# Patient Record
Sex: FEMALE | Race: WHITE
[De-identification: names, ages, dates, MRNs, and addresses within clinical notes are randomized per-mention and may not be internally consistent; named-entity substitution may affect disease eponyms.]

---

## 2017-05-14 ENCOUNTER — HOSPITAL ENCOUNTER (EMERGENCY)
Dept: HOSPITAL 25 - UCCORT | Age: 62
Discharge: HOME | End: 2017-05-14
Payer: COMMERCIAL

## 2017-05-14 VITALS — DIASTOLIC BLOOD PRESSURE: 54 MMHG | SYSTOLIC BLOOD PRESSURE: 103 MMHG
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
## 2017-05-14 DIAGNOSIS — Z95.5: ICD-10-CM

## 2017-05-14 DIAGNOSIS — M25.462: ICD-10-CM

## 2017-05-14 DIAGNOSIS — S83.92XA: Primary | ICD-10-CM

## 2017-05-14 DIAGNOSIS — Z88.2: ICD-10-CM

## 2017-05-14 DIAGNOSIS — Z87.891: ICD-10-CM

## 2017-05-14 DIAGNOSIS — I25.2: ICD-10-CM

## 2017-05-14 DIAGNOSIS — Z79.84: ICD-10-CM

## 2017-05-14 DIAGNOSIS — Z87.442: ICD-10-CM

## 2017-05-14 DIAGNOSIS — I10: ICD-10-CM

## 2017-05-14 DIAGNOSIS — E11.9: ICD-10-CM

## 2017-05-14 DIAGNOSIS — Z95.0: ICD-10-CM

## 2017-05-14 DIAGNOSIS — M17.12: ICD-10-CM
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
## 2017-05-14 DIAGNOSIS — Z88.6: ICD-10-CM
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
## 2017-05-14 DIAGNOSIS — J45.909: ICD-10-CM

## 2017-05-14 PROCEDURE — G0463 HOSPITAL OUTPT CLINIC VISIT: HCPCS

## 2017-05-14 PROCEDURE — 99212 OFFICE O/P EST SF 10 MIN: CPT

## 2017-05-14 NOTE — UC
Lower Extremity/Ankle HPI





- HPI Summary


HPI Summary: 





complaint of left leg pain that worsened approx 2 months ago


6 months ago she fell of a ramp backwards and thats when she started having leg 

pain


in 2017 PCP found a lump in her left groin- lump has resolved 


for the past 2 months the  pain starts in upper thigh and radiates into her 

knee 


left knee pain for 2 months


aching pain - worse at night because she sleeps on her left side and weight of 

right leg pressing on left leg increases pain


pain is worse when she is ambulating


nothing lessenes the pain


never had folloup appt with Dr Woodard





- History of Current Complaint


Chief Complaint: UCLowerExtremity


Stated Complaint: LEFT LEG PAIN


Time Seen by Provider: 17 13:15


Hx Obtained From: Patient





- Allergies/Home Medications


Allergies/Adverse Reactions: 


 Allergies











Allergy/AdvReac Type Severity Reaction Status Date / Time


 


Aspirin [ASA] Allergy Intermediate GI Upset Verified 17 13:07


 


Ibuprofen [From Motrin] Allergy Intermediate GI Upset Verified 17 13:07


 


Sulfa Drugs Allergy Intermediate GI Upset Verified 17 13:07


 


Camphor [From Vicks Vaporub] Allergy  "LUNGS Verified 17 13:07





   TIGHTEN UP"  


 


Eucalyptus Oil Allergy  "LUNGS Verified 17 13:07





[From Vicks Vaporub]   TIGHTEN UP"  


 


Latex Allergy  red and Verified 17 13:07





   itchy  


 


Menthol [From Vicks Vaporub] Allergy  "LUNGS Verified 17 13:07





   TIGHTEN UP"  


 


adhesive tape Allergy Intermediate Rash Uncoded 17 13:07


 


Mints Allergy  Coughing Uncoded 17 13:07











Home Medications: 


 Home Medications





Acetaminophen [Acetaminophen Extra Stren] 1,500 mg PO BEDTIME 17 [History 

Confirmed 17]


Aspirin EC Low Dose* [Ecotrin EC Low Dose 81 MG*] 81 mg PO DAILY 17 [

History Confirmed 17]


Pantoprazole TAB (NF) [Protonix TAB (NF)] 40 mg PO DAILY 17 [History 

Confirmed 17]


metFORMIN* [Glucophage 850 MG TAB *] 850 mg PO 0800,1200,1700 17 [History 

Confirmed 17]











PMH/Surg Hx/FS Hx/Imm Hx


Previously Healthy: Yes


Endocrine History Of: Reports: Diabetes


Cardiovascular History Of: Reports: Cardiac Disorders - MI , Stents, 

Pacemaker, Hypertension, Pacemaker/ICD, Myocardial Infarction


Respiratory History Of: Reports: Asthma


   Denies: COPD


GI/ History Of: Reports: Kidney Stones


Cancer History Of: 


   Denies: Breast Cancer





- Surgical History


Surgical History: Yes


Surgery Procedure, Year, and Place:  X 3SHOCKWAVE LITHOTRIPSY-

CMCCARDIAC STENTS- CMCOophorectomyCARDIAC STENTS AND PACEMAKER INSERTION, AUG 

2013





- Family History


Known Family History: 


   Negative: Cardiac Disease, Hypertension, Diabetes





- Social History


Occupation: Disabled


Lives: With Family


Alcohol Use: None


Substance Use Type: None


Smoking Status (MU): Former Smoker


Type: Cigarettes


Amount Used/How Often: 1 PPD


Length of Time of Smoking/Using Tobacco: 44 Years


When Did the Patient Quit Smoking/Using Tobacco: 2013


Household Exposure Type: Cigarettes





- Immunization History


Most Recent Influenza Vaccination: 


Most Recent Tetanus Shot: unk


Most Recent Pneumonia Vaccination: none





Review of Systems


Constitutional: Negative


Skin: Negative


Eyes: Negative


ENT: Negative


Respiratory: Negative


Cardiovascular: Negative


Gastrointestinal: Negative


Genitourinary: Negative


Motor: Negative


Neurovascular: Negative


Musculoskeletal: Other: - left leg pain


Neurological: Negative


Psychological: Negative


All Other Systems Reviewed And Are Negative: Yes





Physical Exam


Triage Information Reviewed: Yes


Appearance: No Pain Distress, Well-Nourished


Vital Signs: 


 Initial Vital Signs











Temp  98.8 F   17 13:00


 


Pulse  70   17 13:00


 


Resp  16   17 13:00


 


BP  103/54   17 13:00


 


Pulse Ox  98   17 13:00











Vital Signs Reviewed: Yes


Eyes: Positive: Conjunctiva Clear


ENT: Positive: Pharynx normal, TMs normal


Neck: Positive: No Lymphadenopathy


Respiratory: Positive: Lungs clear, Normal breath sounds, No respiratory 

distress


Cardiovascular: Positive: RRR, No Murmur, Pulses Normal, Brisk Capillary Refill


Abdomen Description: Positive: Nontender, Soft


Bowel Sounds: Positive: Present


Musculoskeletal: Positive: Other: - LLE-knee- No bony deformities, inflammation

, or tenderness in bony prominences or soft tissue.  No bakers cyst. Full ROM (

extension/flexion). Limited internal and external rotation.  Medial, lateral 

meniscus; anterior, posterior cruciate ligaments ,medial & lateral collateral 

ligaments intact as assessed with negative Anterior/Posterior Drawer signs, 

Lachmans, and McMurrays Tests. No effusion, bulge/balloon sign. left thigh - 

slight tenderness in adductor longus- no edema on left thigh


Neurological: Positive: Alert


Psychological Exam: Normal


Skin Exam: Normal





Lower Extremity Course/Dx





- Course


Course Of Treatment: exam completed.  physcial exam reveals muscle tenderness 

in adductor longus.  no edema in LLE- pulses 2 +.  x-ray shows osteoarthritis 

with small effusion- will start NSAIDs refer to ortho and PCP followup





- Differential Dx/Diagnosis


Differential Diagnosis/HQI/PQRI: Contusion, Fracture (Closed), Sprain, Strain


Provider Diagnoses: LLE muscle sprain, osteoarthritis left knee, small effusion 

left knee





Discharge





- Discharge Plan


Condition: Stable


Disposition: HOME


Prescriptions: 


Naproxen Sodium [Naproxen Sodium 220 mg cap] 220 mg PO BID #20 cap


Patient Education Materials:  Osteoarthritis (ED), Musculoskeletal Pain (ED)


Referrals: 


Andre Kumar MD [Primary Care Provider] - 


Additional Instructions: 


Increase fluids and rest


Take naproxen for  pain, rest leg


Please review your discharge instructions. 


If your symptoms do not improve please call your primary care provider or 

return to urgent care.

## 2017-05-14 NOTE — RAD
INDICATION:  Left femur pain.



TECHNIQUE: 2 views of the left femur were obtained.



FINDINGS: The bones are normal alignment. No fracture is seen.



IMPRESSION:  NO EVIDENCE FOR FRACTURE.

## 2017-11-24 ENCOUNTER — HOSPITAL ENCOUNTER (EMERGENCY)
Dept: HOSPITAL 25 - UCEAST | Age: 62
Discharge: HOME | End: 2017-11-24
Payer: COMMERCIAL

## 2017-11-24 VITALS — DIASTOLIC BLOOD PRESSURE: 61 MMHG | SYSTOLIC BLOOD PRESSURE: 123 MMHG

## 2017-11-24 DIAGNOSIS — Z95.5: ICD-10-CM

## 2017-11-24 DIAGNOSIS — Z88.6: ICD-10-CM

## 2017-11-24 DIAGNOSIS — B95.61: ICD-10-CM

## 2017-11-24 DIAGNOSIS — Z95.0: ICD-10-CM

## 2017-11-24 DIAGNOSIS — E78.5: ICD-10-CM

## 2017-11-24 DIAGNOSIS — I10: ICD-10-CM

## 2017-11-24 DIAGNOSIS — Z88.2: ICD-10-CM

## 2017-11-24 DIAGNOSIS — Z91.040: ICD-10-CM

## 2017-11-24 DIAGNOSIS — I25.2: ICD-10-CM

## 2017-11-24 DIAGNOSIS — Z87.891: ICD-10-CM

## 2017-11-24 DIAGNOSIS — L03.011: Primary | ICD-10-CM

## 2017-11-24 DIAGNOSIS — E11.9: ICD-10-CM

## 2017-11-24 PROCEDURE — 87205 SMEAR GRAM STAIN: CPT

## 2017-11-24 PROCEDURE — 10060 I&D ABSCESS SIMPLE/SINGLE: CPT

## 2017-11-24 PROCEDURE — 87186 SC STD MICRODIL/AGAR DIL: CPT

## 2017-11-24 PROCEDURE — 99212 OFFICE O/P EST SF 10 MIN: CPT

## 2017-11-24 PROCEDURE — 87641 MR-STAPH DNA AMP PROBE: CPT

## 2017-11-24 PROCEDURE — 87070 CULTURE OTHR SPECIMN AEROBIC: CPT

## 2017-11-24 PROCEDURE — 87640 STAPH A DNA AMP PROBE: CPT

## 2017-11-24 PROCEDURE — G0463 HOSPITAL OUTPT CLINIC VISIT: HCPCS

## 2017-11-24 PROCEDURE — 87077 CULTURE AEROBIC IDENTIFY: CPT

## 2017-11-24 NOTE — UC
Progress





- Progress Note


Progress Note: 





Received report of S. aureus positive sample on wound culture from today. MRSA 

negative. She was prescribed cephalexin and bactrim. Treatment is appropriate 

pending sensitivities.

## 2017-11-24 NOTE — UC
Hand/Wrist HPI





- HPI Summary


HPI Summary: 





63 yo female with right index pain/redness/swelling that started 3 days ago


started near lateral nail bed


no with fusiform swelling of finger extending to dorsum of hand


no f/c


she is a diabetic











- History Of Current Complaint


Chief Complaint: UCUpperExtremity


Stated Complaint: FINGER INJURY


Time Seen by Provider: 17 15:08


Hx Obtained From: Patient


Onset/Duration: Gradual Onset, Lasting Days


Severity Initially: Mild


Severity Currently: Severe


Pain Intensity: 8 - if she bumps it


Character Of Pain: Throbbing


Aggravating Factor(s): Movement


Alleviating Factor(s): Rest


Associated Signs And Symptoms: Positive: Swelling, Redness


Related History: Dominant Hand Right





- Allergies/Home Medications


Allergies/Adverse Reactions: 


 Allergies











Allergy/AdvReac Type Severity Reaction Status Date / Time


 


Aspirin [ASA] Allergy Intermediate GI Upset Verified 17 14:57


 


Ibuprofen [From Motrin] Allergy Intermediate GI Upset Verified 17 14:57


 


Sulfa Drugs Allergy Intermediate GI Upset Verified 17 14:57


 


Camphor [From Vicks Vaporub] Allergy  "LUNGS Verified 17 14:57





   TIGHTEN UP"  


 


Eucalyptus Oil Allergy  "LUNGS Verified 17 14:57





[From Vicks Vaporub]   TIGHTEN UP"  


 


Latex Allergy  red and Verified 17 14:57





   itchy  


 


Menthol [From Vicks Vaporub] Allergy  "LUNGS Verified 17 14:57





   TIGHTEN UP"  


 


adhesive tape Allergy Intermediate Rash Uncoded 17 14:57


 


Mints Allergy  Coughing Uncoded 17 14:57














PMH/Surg Hx/FS Hx/Imm Hx


Previously Healthy: Yes


Endocrine History: Diabetes, Dyslipidemia


Cardiovascular History: Cardiac Disease, Hypertension, Myocardial Infarction





- Surgical History


Surgical History: Yes


Surgery Procedure, Year, and Place:  X 3SHOCKWAVE LITHOTRIPSY-

CMCCARDIAC STENTS- CMCOophorectomyCARDIAC STENTS AND PACEMAKER INSERTION, AUG 

2013





- Family History


Known Family History: 


   Negative: Cardiac Disease, Hypertension, Diabetes





- Social History


Alcohol Use: None


Substance Use Type: Prescribed


Smoking Status (MU): Former Smoker


Type: Cigarettes


Amount Used/How Often: 1 PPD


Length of Time of Smoking/Using Tobacco: 44 Years


When Did the Patient Quit Smoking/Using Tobacco: 2013


Household Exposure Type: Cigarettes





- Immunization History


Most Recent Influenza Vaccination: 


Most Recent Tetanus Shot: unk


Most Recent Pneumonia Vaccination: none





Review of Systems


Constitutional: Negative


Skin: Negative


Eyes: Negative


ENT: Negative


Respiratory: Negative


Cardiovascular: Negative


Gastrointestinal: Negative


Genitourinary: Negative


Motor: Negative


Neurovascular: Negative


Musculoskeletal: Negative


Neurological: Negative


Psychological: Negative


Is Patient Immunocompromised?: No


All Other Systems Reviewed And Are Negative: Yes





Physical Exam


Triage Information Reviewed: Yes


Appearance: Well-Appearing, No Pain Distress, Well-Nourished


Vital Signs: 


 Initial Vital Signs











Temp  98.1 F   17 14:53


 


Pulse  74   17 14:53


 


Resp  12   17 14:53


 


BP  123/61   17 14:53


 


Pulse Ox  99   17 14:53











Eyes: Positive: Conjunctiva Clear


ENT: Negative: Nasal congestion, Nasal drainage, Trismus, Muffled voice, Hoarse 

voice


Neck: Positive: Supple


Respiratory: Positive: Lungs clear, Normal breath sounds, No respiratory 

distress, No accessory muscle use


Cardiovascular: Positive: RRR, No Murmur


Musculoskeletal: Positive: Other: - see image


Neurological: Positive: Alert, Muscle Tone Normal


Skin Exam: Other - see image





Procedures





- Procedure Summary


Procedure Summary: 





I&D right index finger paronychia





TO





sterile prep





incised with 18 gu needle


glo pus expresses





culture obtained 





tolerated procedure well





- Incision and Drainage


Site: right index finger


Instrument(s): Needle





Hand/Wrist Course/Dx





- Differential Dx/Diagnosis


Provider Diagnoses: right index finger paronychia.  right index finger 

cellulitis





Discharge





- Discharge Plan


Condition: Stable


Disposition: HOME


Prescriptions: 


Cephalexin CAP* [Keflex CAP*] 500 mg PO QID #28 cap


Sulfamethox/Trimethoprim DS* [Bactrim /160 TAB*] 1 tab PO BID #14 tab


Patient Education Materials:  Paronychia (ED), Cellulitis (ED)


Referrals: 


Andre Kumar MD [Primary Care Provider] - 3 Days


Additional Instructions: 


to er for new or worsening symptoms





if the bactrim DS upsets your stomach take with food





a culture is pending


we will try to eliminate one of the antibiotics once your culture results return





Images


Hands: 


  __________________________














  __________________________





 1 - red/swollen/paroshubhama

## 2017-11-26 NOTE — UC
Progress





- Progress Note


Progress Note: 


sensitive to bactrim and cephalexin. 


no changes


[facility] 11/26/17  [id number]

## 2017-12-03 ENCOUNTER — HOSPITAL ENCOUNTER (EMERGENCY)
Dept: HOSPITAL 25 - ED | Age: 62
Discharge: HOME | End: 2017-12-03
Payer: COMMERCIAL

## 2017-12-03 VITALS — DIASTOLIC BLOOD PRESSURE: 62 MMHG | SYSTOLIC BLOOD PRESSURE: 108 MMHG

## 2017-12-03 DIAGNOSIS — R60.9: ICD-10-CM

## 2017-12-03 DIAGNOSIS — L02.511: Primary | ICD-10-CM

## 2017-12-03 LAB
ALBUMIN SERPL BCG-MCNC: 4 G/DL (ref 3.2–5.2)
ALP SERPL-CCNC: 98 U/L (ref 34–104)
ALT SERPL W P-5'-P-CCNC: 5 U/L (ref 7–52)
ANION GAP SERPL CALC-SCNC: 11 MMOL/L (ref 2–11)
AST SERPL-CCNC: 11 U/L (ref 13–39)
BUN SERPL-MCNC: 22 MG/DL (ref 6–24)
BUN/CREAT SERPL: 17.5 (ref 8–20)
CALCIUM SERPL-MCNC: 9.7 MG/DL (ref 8.6–10.3)
CHLORIDE SERPL-SCNC: 98 MMOL/L (ref 101–111)
GLOBULIN SER CALC-MCNC: 3.3 G/DL (ref 2–4)
GLUCOSE SERPL-MCNC: 220 MG/DL (ref 70–100)
HCO3 SERPL-SCNC: 23 MMOL/L (ref 22–32)
HCT VFR BLD AUTO: 42 % (ref 35–47)
HGB BLD-MCNC: 14.5 G/DL (ref 12–16)
MCH RBC QN AUTO: 31 PG (ref 27–31)
MCHC RBC AUTO-ENTMCNC: 34 G/DL (ref 31–36)
MCV RBC AUTO: 89 FL (ref 80–97)
POTASSIUM SERPL-SCNC: 4.8 MMOL/L (ref 3.5–5)
PROT SERPL-MCNC: 7.3 G/DL (ref 6.4–8.9)
RBC # BLD AUTO: 4.73 10^6/UL (ref 4–5.4)
SODIUM SERPL-SCNC: 132 MMOL/L (ref 133–145)
WBC # BLD AUTO: 8.4 10^3/UL (ref 3.5–10.8)

## 2017-12-03 PROCEDURE — 87205 SMEAR GRAM STAIN: CPT

## 2017-12-03 PROCEDURE — 87040 BLOOD CULTURE FOR BACTERIA: CPT

## 2017-12-03 PROCEDURE — 87077 CULTURE AEROBIC IDENTIFY: CPT

## 2017-12-03 PROCEDURE — 73140 X-RAY EXAM OF FINGER(S): CPT

## 2017-12-03 PROCEDURE — 99283 EMERGENCY DEPT VISIT LOW MDM: CPT

## 2017-12-03 PROCEDURE — 87640 STAPH A DNA AMP PROBE: CPT

## 2017-12-03 PROCEDURE — 80053 COMPREHEN METABOLIC PANEL: CPT

## 2017-12-03 PROCEDURE — 10060 I&D ABSCESS SIMPLE/SINGLE: CPT

## 2017-12-03 PROCEDURE — 36415 COLL VENOUS BLD VENIPUNCTURE: CPT

## 2017-12-03 PROCEDURE — 87186 SC STD MICRODIL/AGAR DIL: CPT

## 2017-12-03 PROCEDURE — 87641 MR-STAPH DNA AMP PROBE: CPT

## 2017-12-03 PROCEDURE — 87070 CULTURE OTHR SPECIMN AEROBIC: CPT

## 2017-12-03 PROCEDURE — 83605 ASSAY OF LACTIC ACID: CPT

## 2017-12-03 PROCEDURE — 85025 COMPLETE CBC W/AUTO DIFF WBC: CPT

## 2017-12-03 NOTE — XMS REPORT
Riri Monzon

 Created on:2017



Patient:Riri Monzon

Sex:Female

:1955

External Reference #:[id number]





Demographics







 Address  [address] [place] [address]

 

 Mobile Phone  [phone]

 

 Preferred Language  English

 

 Marital Status  Not  Or 

 

 Jain Affiliation  Unknown

 

 Race  White

 

 Ethnic Group  Not  Or 









Author







 Organization  [facility]

 

 Address  [address] [facility] [address]



   [place] [address]

 

 Phone  [phone]









Support







 Name  Relationship  Address  Phone

 

 Thomas Carter  Sibling  Unavailable  [phone]









Care Team Providers







 Name  Role  Phone

 

 Adnre Kumar MD  Care Team Information   Unavailable









Payers







 Type  Date  Identification Numbers  Payment Provider  Subscriber

 

 Commercial  Effective:  Policy Number: [id number]  [facility]  Riri Monzon



   10/13/2014      









 PayID: [id number]  [address] [place] [address]







Problems







 Date  Description  Provider  Status

 

 Onset: 10/13/2014  Myopia  [facility] M[facility]  Active

 

 Onset: 10/13/2014  Atopic dermatitis  [facility] M[facility]  Active

 

 Onset: 10/13/2014  Psoriasis  [facility] M[facility]D.  Active

 

 Onset: 10/13/2014  Asthma without status asthmaticus  [facility] M.D.  
Active

 

 Onset: 10/13/2014  Allergic rhinitis  [facility] M.D.  Active

 

 Onset: 10/13/2014  Benign essential hypertension  [facility]re Kumar M.D.  Active

 

 Onset: 10/13/2014  Cardiac pacemaker in situ  [facility] M.D.  Active

 

 Onset: 10/13/2014  Sinus node dysfunction  [facility] M.D.  Active

 

 Onset: 10/13/2014  Chronic obstructive lung disease  [facility] M.D.  
Active

 

 Onset: 10/13/2014  Indigestion  [facility] M.D.  Active

 

 Onset: 10/13/2014  Peptic reflux disease  [facility] M.D.  Active

 

 Onset: 10/13/2014  Low back pain  [facility] M.D.  Active

 

 Onset: 10/13/2014  Osteoarthritis  [facility]re Kumar M.D.  Active

 

 Onset: 10/13/2014  Type II diabetes mellitus uncontrolled  [facility] M.D.  Active

 

 Onset: 10/13/2014  Mixed hyperlipidemia  [facility] M.D.  Active

 

 Onset: 10/13/2014  Old myocardial infarction  [facility] M.D.  Active

 

 Onset: 10/13/2014  Coronary arteriosclerosis  [facility] M[facility]  Active







Family History







 Date  Family Member(s)  Problem(s)  Comments

 

   Father   due to he passed when pt  ()



     was 18 months old  

 

   Father  Alcoholism  

 

   Mother   due to a tage 69  ()

 

   Mother   due to Diabetes  ()

 

   First Son  mental disorders various d/t  



     brain injury  

 

   First Son  37  

 

   Second Son  dyslexia  

 

   Second Son  33  

 

   Second Son  lazy eye  

 

   Third Son  strong mood swings  

 

   Third Son  30  

 

   First Brother  Age is Unknown  

 

   First Brother  Cancer  

 

   First Brother   due to Cancer  ()

 

   Second Brother   due to passed  ()

 

   Second Brother  Age is Unknown  

 

   Second Brother   due to Diabetes  ()

 

   Second Brother   due to kidney failure  ()

 

   Third Brother   due to passed as infant  () - " blue baby"



       heart failure

 

   Third Brother  Age is Unknown  

 

   Fourth Brother  Age is Unknown  

 

   Fourth Brother  Diabetes  

 

   Fifth Brother  Age is Unknown  

 

   Fifth Brother  Diabetes  







Social History







 Type  Date  Description  Comments

 

 Marital Status    Legal Status:   

 

 ETOH Use    Denies alcohol use  

 

 Smoking    Patient is a former smoker  

 

 Daily Caffeine    Consumes on average 2 cups of regular coffee per day  







Allergies, Adverse Reactions, Alerts







 Date  Description  Reaction  Status  Severity  Comments

 

 10/13/2014  Latex    active    

 

 10/13/2014  Aspirin    active    

 

 10/13/2014  Sulfa Antibiotics    active    

 

 10/13/2014  Nuts    active    

 

 10/13/2014  Vicksvapor Rub    active    

 

 10/13/2014  Mornix    active    

 

 2017  Lisinopril    active    

 

 2017  Bandaids  Urticaria  active    







Medications







 Medication  Date  Status  Form  Strength  Qnty  SIG  Indications  Ordering



                 Provider

 

 Glipizide    Active  Tablets  10mg  90tabs  Take 1  E11.65  José,



   017          Tablet    Ahmad M.,



             By Mouth    M.D.



             3 Times    



             A Day    

 

 Tramadol HCL    Active  Tablets  50mg  90tabs  1 by  M25.562  José,



   017          mouth    Ahmad M.,



             three    M.D.



             times a    



             day    

 

 Naproxen    Active  Tablets  250mg  60tabs  Take 1    José,



   017          Tablet    Ahmad M.,



             By Mouth    M.D.



             Twice A    



             Day    

 

 Advair Diskus    Active  Aerosol  250-50mcg/D  60units  inhale 1    
José,



   015      ose    puff by    Ahmad M.,



             mouth    M.D.



             twice    



             daily    

 

 Atorvastatin    Active  Tablets  20mg      E78.2  Unknown



 Calcium  000              









 I25.10

 

 I25.2









 Furosemide    Active  Tablets  20mg    Take 1 Tablet By Mouth  
I25.10  Unknown



             Every Day    









 R60.0









 Clopidogrel Bisulfate    Active  Tablets  75mg    Take 1 Tablet By  
I25.2  Unknown



             Mouth Every Day    









 I25.10









 Proair HFA    Active  Aerosol  108(90Base)  2Mdi  use 2  J45.909  
José,



         mcg/Act    puffs one    Ahmad M.,



             to four    M.D.



             times    



             daily    









 J44.9









 Diovan    Active  Tablets  80mg    Take 1 Tablet By Mouth  I25.10  
Unknown



             Every Day    

 

 Nitrostat    Active  Tablets Sub  0.4mg    Place 1 Tablet Under  
I25.10  Unknown



             The Tongue as Directed    









 I25.2









 Metformin HCL    Active  Tablets  850mg  90tabs  take 1  E11.65  
José,



             tablet by    Ahmad M.,



             mouth 3    M.D.



             times a    



             day    

 

 Pantoprazole    Active  Tablets DR  40mg  30tabs  take 1    José,



 Sodium            tablet by    Ahmad M.,



             mouth    M.D.



             every day    

 

 Gabapentin    Active  Tablets  600mg  90tabs  take 1  M54.5  José,



             tablet by    Ahmad M.,



             mouth 3    M.D.



             times a    



             day    









 M15.9









 Metoprolol    Active  Tablets ER  25mg    1 by  I25.10  Unknown



 Succinate ER      24HR      mouth    



             every    



             day    

 

 Aspirin Adult Low    Active  Tablets DR  81mg    1 by    Unknown



 Dose            mouth    



             every    



             day    

 

 Aldactone    Active  Tablets  25mg    1/2 tab    Unknown



             qd    

 

                 

 

 Januvia  2017 -  Hx  Tablets  100mg  Samples  1 tabs    José,



   2017          by mouth    Andre PITTMAN,



             every    M.D.



             day    

 

 Clarithromycin  2015 -  Hx  Tablets  500mg  28tabs  1 tab by    José,



   2015          mouth    Andre PITTMAN,



             twice a    M.D.



             day    

 

 Glipizide   -  Hx  Tablets  10mg      250.02  Unknown



   10/21/2014              

 

 Pantoprazole   -  Hx  Tablets DR  40mg    Take 1    Unknown



 Sodium  10/13/2014          Tablet    



             By Mouth    



             Every    



             Day    

 

 Metoprolol   -  Hx  Tablets ER  50mg    Take 1  I25.10  Unknown



 Succinate ER  2017    24HR      Tablet    



             By Mouth    



             Every    



             Day    









 M25.562









 Loratadine   -  Hx  Tablets  10mg    take 1 tablet  477.8  Andre Kumar



   2015          by mouth every    M., M.D.



             day    

 

 Valsartan   -  Hx  Tablets  80mg      414.01  Adam,



   10/13/2014              MD Pineda









 412

 

 401.1









 Meloxicam   -  Hx  Tablets  7.5mg    Take One Tablet    Unknown



   10/13/2014          By Mouth Daily    

 

 Ranitidine HCL   -  Hx  Tablets  300mg        Tk,



   10/13/2014              MD Annika

 

 Cephalexin   -  Hx  Capsules  500mg        Unknown



   10/13/2014              

 

 Amiodarone HCL   -  Hx  Tablets  200mg    Take 1 Tablet    Unknown



   10/13/2014          By Mouth Twice    



             A Day For 7    



             Days, Then    



             Decrease To 1    



             Tabl    

 

 Metformin HCL   -  Hx  Tablets  1000mg        Unknown



   10/13/2014              

 

 [facility]S Acid Reducer   -  Hx  Tablets  150mg        Tk,



 Maximum Strength  10/13/2014              MD Annika

 

 [facility] Loratadine   -  Hx  Tablets  10mg    Take 1 Tablet    Unknown



   10/13/2014          By Mouth Every    



             Day    

 

 Losartan   -  Hx  Tablets  25mg    take 1 tablet    José, Ahmad



 Potassium  10/13/2014          by mouth every    M., M.D.



             day    

 

 [facility]S Acid Reducer   -  Hx  Tablets  150mg        Tk,



 Maximum Strength  2015              MD Annika







Medications Administered in Office







 Medication  Date  Status  Form  Strength  Qnty  SIG  Indications  Ordering



                 Provider

 

 Intradermal    Administered  Injection          White,



 Eliasbernice  Ana Martinez FNP







Immunizations







 CPT Code  Status  Date  Vaccine  Lot #

 

 [id number]  Given  10/09/2017  Flu Vaccine  [id number]

 

 [id number]  Given  10/19/2016  Pneumovax  [id number]

 

 48031  Given  10/15/2015  Flu Vaccine  [id number]

 

 53378  Given  10/13/2014  Flu Vaccine  [id number]







Vital Signs







 Date  Vital  Result  Comment

 

 2017  BP Systolic  122 mmHg  









 BP Diastolic  66 mmHg  

 

 Height  66 inches  5'6"

 

 Weight  197.00 lb  

 

 BMI (Body Mass Index)  31.8 kg/m2  

 

 Heart Rate  78 /min  

 

 Respiratory Rate  16 /min  









 10/09/2017  BP Systolic  130 mmHg  









 BP Diastolic  68 mmHg  

 

 Weight  197.00 lb  

 

 Heart Rate  77 /min  

 

 Respiratory Rate  16 /min  









 2017  BP Systolic  110 mmHg  









 BP Diastolic  62 mmHg  

 

 Height  66 inches  5'6"

 

 Weight  195.00 lb  

 

 BMI (Body Mass Index)  31.5 kg/m2  

 

 Heart Rate  63 /min  

 

 Respiratory Rate  16 /min  









 2017  BP Systolic  110 mmHg  









 BP Diastolic  72 mmHg  

 

 Height  66 inches  5'6"

 

 Weight  194.00 lb  

 

 BMI (Body Mass Index)  31.3 kg/m2  

 

 Heart Rate  62 /min  

 

 Respiratory Rate  18 /min  









 2017  BP Systolic  124 mmHg  









 BP Diastolic  62 mmHg  

 

 Height  66 inches  5'6"

 

 Weight  196.00 lb  

 

 BMI (Body Mass Index)  31.6 kg/m2  

 

 Heart Rate  72 /min  

 

 Respiratory Rate  16 /min  









 10/19/2016  BP Systolic  126 mmHg  









 BP Diastolic  74 mmHg  

 

 Height  66 inches  5'6"

 

 Weight  209.00 lb  

 

 BMI (Body Mass Index)  33.7 kg/m2  

 

 Heart Rate  68 /min  

 

 Respiratory Rate  18 /min  









 2016  BP Systolic  120 mmHg  









 BP Diastolic  62 mmHg  

 

 Height  66 inches  5'6"

 

 Weight  222.00 lb  

 

 BMI (Body Mass Index)  35.8 kg/m2  

 

 Heart Rate  84 /min  

 

 Respiratory Rate  18 /min  









 2016  BP Systolic  119 mmHg  









 BP Diastolic  76 mmHg  

 

 Height  66 inches  5'6"

 

 Weight  225.00 lb  

 

 BMI (Body Mass Index)  36.3 kg/m2  

 

 Heart Rate  75 /min  

 

 Respiratory Rate  18 /min  









 2016  BP Systolic  122 mmHg  









 BP Diastolic  70 mmHg  

 

 Height  66 inches  5'6"

 

 Weight  226.00 lb  

 

 BMI (Body Mass Index)  36.5 kg/m2  

 

 Heart Rate  71 /min  

 

 Body Temperature  96.1 F  

 

 Respiratory Rate  18 /min  









 2015  BP Systolic  140 mmHg  









 BP Diastolic  77 mmHg  

 

 Height  66 inches  5'6"

 

 Weight  228.00 lb  

 

 BMI (Body Mass Index)  36.8 kg/m2  

 

 Heart Rate  80 /min  

 

 Respiratory Rate  18 /min  









 2015  BP Systolic  123 mmHg  









 BP Diastolic  78 mmHg  

 

 Height  66 inches  5'6"

 

 Weight  227.00 lb  

 

 BMI (Body Mass Index)  36.6 kg/m2  

 

 Heart Rate  63 /min  

 

 Body Temperature  97.0 F  

 

 Respiratory Rate  18 /min  









 10/15/2015  BP Systolic  122 mmHg  









 BP Diastolic  76 mmHg  

 

 Height  66 inches  5'6"

 

 Weight  222.00 lb  

 

 BMI (Body Mass Index)  35.8 kg/m2  

 

 Heart Rate  67 /min  

 

 Respiratory Rate  18 /min  









 2015  BP Systolic  11 mmHg  









 BP Diastolic  75 mmHg  

 

 Height  66 inches  5'6"

 

 Weight  215.00 lb  

 

 BMI (Body Mass Index)  34.7 kg/m2  

 

 Heart Rate  65 /min  

 

 Respiratory Rate  16 /min  









 2015  BP Systolic  125 mmHg  









 BP Diastolic  78 mmHg  

 

 Height  66 inches  5'6"

 

 Weight  211.00 lb  

 

 BMI (Body Mass Index)  34.1 kg/m2  

 

 Heart Rate  61 /min  

 

 Respiratory Rate  15 /min  









 07/10/2015  BP Systolic  105 mmHg  









 BP Diastolic  64 mmHg  

 

 Height  66 inches  5'6"

 

 Weight  217.00 lb  

 

 BMI (Body Mass Index)  35.0 kg/m2  

 

 Heart Rate  61 /min  

 

 Respiratory Rate  16 /min  









 2015  BP Systolic  106 mmHg  









 BP Diastolic  66 mmHg  

 

 Height  66 inches  5'6"

 

 Weight  220.00 lb  

 

 BMI (Body Mass Index)  35.5 kg/m2  

 

 Heart Rate  62 /min  

 

 Respiratory Rate  15 /min  









 2015  BP Systolic  132 mmHg  









 BP Diastolic  74 mmHg  

 

 Height  66 inches  5'6"

 

 Weight  220.00 lb  

 

 BMI (Body Mass Index)  35.5 kg/m2  

 

 Heart Rate  62 /min  

 

 Respiratory Rate  15 /min  









 2015  BP Systolic  123 mmHg  









 BP Diastolic  73 mmHg  

 

 Height  66 inches  5'6"

 

 Weight  222.00 lb  

 

 BMI (Body Mass Index)  35.8 kg/m2  

 

 Heart Rate  62 /min  

 

 Body Temperature  97.4 F  

 

 Respiratory Rate  15 /min  









 2014  BP Systolic  144 mmHg  









 BP Diastolic  81 mmHg  

 

 Height  66 inches  5'6"

 

 Weight  227.00 lb  

 

 BMI (Body Mass Index)  36.6 kg/m2  

 

 Heart Rate  60 /min  

 

 Respiratory Rate  18 /min  









 10/21/2014  BP Systolic  128 mmHg  









 BP Diastolic  64 mmHg  

 

 Height  66 inches  5'6"

 

 Weight  221.00 lb  

 

 BMI (Body Mass Index)  35.7 kg/m2  

 

 Heart Rate  61 /min  

 

 Respiratory Rate  18 /min  









 10/14/2014  BP Systolic  127 mmHg  









 BP Diastolic  66 mmHg  

 

 Height  66 inches  5'6"

 

 Weight  220.00 lb  

 

 BMI (Body Mass Index)  35.5 kg/m2  

 

 Heart Rate  67 /min  

 

 Respiratory Rate  18 /min  









 10/13/2014  BP Systolic  138 mmHg  









 BP Diastolic  72 mmHg  

 

 Height  66 inches  5'6"

 

 Weight  220.00 lb  

 

 BMI (Body Mass Index)  35.5 kg/m2  

 

 Heart Rate  60 /min  

 

 Body Temperature  96.4 F  

 

 Respiratory Rate  18 /min  







Results







 Test  Date  Test  Result  H/L  Range  Note

 

 CBC Auto Diff  2017  White Blood Count  6.5 10^3/uL    3.5-10.8  









 Red Blood Count  4.87 10^6/uL    4.0-5.4  

 

 Hemoglobin  15.1 g/dL    12.0-16.0  

 

 Hematocrit  44 %    35-47  

 

 Mean Corpuscular Volume  90 fL    80-97  

 

 Mean Corpuscular Hemoglobin  31 pg    27-31  

 

 Mean Corpuscular HGB Conc  34 g/dL    31-36  

 

 Red Cell Distribution Width  13 %    10.5-15  

 

 Platelet Count  125 10^3/uL  Low  150-450  

 

 Mean Platelet Volume  11 um3  High  7.4-10.4  

 

 Abs Neutrophils  4.0 10^3/uL    1.5-7.7  

 

 Abs Lymphocytes  1.9 10^3/uL    1.0-4.8  

 

 Abs Monocytes  0.4 10^3/uL    0-0.8  

 

 Abs Eosinophils  0.2 10^3/uL    0-0.6  

 

 Abs Basophils  0 10^3/uL    0-0.2  

 

 Abs Nucleated RBC  0 10^3/uL      

 

 Granulocyte %  61.6 %    38-83  

 

 Lymphocyte %  29.0 %    25-47  

 

 Monocyte %  5.6 %    1-9  

 

 Eosinophil %  3.1 %    0-6  

 

 Basophil %  0.7 %    0-2  

 

 Nucleated Red Blood Cells %  0.1      









 Comp Metabolic Panel  2017  Sodium  136 mmol/L    133-145  









 Potassium  4.6 mmol/L    3.5-5.0  

 

 Chloride  99 mmol/L  Low  101-111  

 

 Co2 Carbon Dioxide  29 mmol/L    22-32  

 

 Anion Gap  8 mmol/L    2-11  

 

 Glucose  207 mg/dL  High    

 

 Blood Urea Nitrogen  26 mg/dL  High  6-24  

 

 Creatinine  0.93 mg/dL    0.51-0.95  

 

 BUN/Creatinine Ratio  28.0  High  8-20  

 

 Calcium  9.5 mg/dL    8.6-10.3  

 

 Total Protein  6.6 g/dL    6.4-8.9  

 

 Albumin  4.1 g/dL    3.2-5.2  

 

 Globulin  2.5 g/dL    2-4  

 

 Albumin/Globulin Ratio  1.6    1-3  

 

 Total Bilirubin  0.80 mg/dL    0.2-1.0  

 

 Alkaline Phosphatase  82 U/L      

 

 Alt  6 U/L  Low  7-52  

 

 Ast  12 U/L  Low  13-39  

 

 Egfr Non-  61.5    &gt;60  

 

 Egfr   79.1    &gt;60  1









 CKMB  2017  CKMB  ng/mL  2.0 ng/mL    0.6-6.3  

 

 Lipid Profile (Trig/Chol/HDL)  2017  Triglycerides  153 mg/dL      2









 Cholesterol  128 mg/dL      3

 

 HDL Cholesterol  48.0 mg/dL      4

 

 LDL Cholesterol  49 mg/dL      5









 Laboratory test  2017  Hemoglobin A1c  10.0 %  High  Less than 6.0  6



 finding    (Glyco HGB)        

 

 Urine Microalbumin  2017  Urine Creatinine  71.34 mg/dL      



 Random            









 Ur Microalbumin (mg/L)  &lt; 15.0 mg/L      

 

 Urine Microalbumin/Creatinine  TNP ug/mg    &lt;31  7









 Laboratory test finding  2017  TSH (Thyroid Stim  2.83 mcIU/mL    0.34-
5.60  8



     Horm)        

 

 Iron &amp; Iron Binding  2017  Iron  165 g/dL      



 Capacity            









 Unsaturated Iron Binding  163 g/dL      

 

 Total Iron Binding Capacity  328 g/dL    250-450  

 

 % Iron Saturation  50 %    15-55  









 Laboratory test finding  2016  Magnesium  1.5 mg/dL  Low  1.9-2.7  









 TSH (Thyroid Stim Horm)  2.73 ?IU/mL    0.34-5.60  

 

 Free T4 (Free Thyroxine)  1.10 ng/mL    0.61-1.12  









 Urine Microalbumin Random  2016  Ur Microalbumin (mg/L)  10.0 mg/L      









 Urine Creatinine  115.43 mg/dL      

 

 Urine Microalbumin/Creatinine  8.6 ug/mg    &lt;31  









 Laboratory test finding  2016  Uric Acid  5.6 mg/dL    2.3-6.6  









 Hemoglobin A1c (Glyco HGB)  10.5 %  High  Less than 6.0  9

 

 Vitamin D Total 25(Oh)  21.2 ng/mL  Low  30-50  









 Lipid Profile (Trig/Chol/HDL)  2016  Triglycerides  114 mg/dL      10









 Cholesterol  117 mg/dL      11

 

 HDL Cholesterol  52.9 mg/dL      12

 

 LDL Cholesterol  41 mg/dL      13









 Laboratory test finding  2016  CRP High Sensitivity  7.73 mg/L      14

 

 Comp Metabolic Panel  2016  Sodium  134 mmol/L    133-145  









 Potassium  4.3 mmol/L    3.5-5.0  

 

 Chloride  95 mmol/L  Low  101-111  

 

 Co2 Carbon Dioxide  27 mmol/L    22-32  

 

 Anion Gap  12 mmol/L  High  2-11  

 

 Glucose  307 mg/dL  High    

 

 Blood Urea Nitrogen  19 mg/dL    6-24  

 

 Creatinine  0.95 mg/dL    0.51-0.95  

 

 BUN/Creatinine Ratio  20.0    8-20  

 

 Calcium  9.4 mg/dL    8.6-10.3  

 

 Total Protein  6.9 g/dL    6.4-8.9  

 

 Albumin  4.6 g/dL    3.2-5.2  

 

 Globulin  2.3 g/dL    2-4  

 

 Albumin/Globulin Ratio  2.0    1-3  

 

 Total Bilirubin  1.10 mg/dL  High  0.2-1.0  

 

 Alkaline Phosphatase  89 U/L      

 

 Alt  10 U/L    7-52  

 

 Ast  19 U/L    13-39  

 

 Egfr Non-  60.4    &gt;60  

 

 Egfr   77.7    &gt;60  15









 CBC Auto Diff  2016  White Blood Count  7.2 10^3/uL    3.5-10.8  









 Red Blood Count  5.01 10^6/uL    4.0-5.4  

 

 Hemoglobin  15.8 g/dL    12.0-16.0  

 

 Hematocrit  48 %  High  35-47  

 

 Mean Corpuscular Volume  95 fL    80-97  

 

 Mean Corpuscular Hemoglobin  32 pg  High  27-31  

 

 Mean Corpuscular HGB Conc  33 g/dL    31-36  

 

 Red Cell Distribution Width  14 %    10.5-15  

 

 Platelet Count  127 10^3/uL  Low  150-450  

 

 Mean Platelet Volume  11 um3  High  7.4-10.4  

 

 Abs Neutrophils  5.3 10^3/uL    1.5-7.7  

 

 Abs Lymphocytes  1.4 10^3/uL    1.0-4.8  

 

 Abs Monocytes  0.4 10^3/uL    0-0.8  

 

 Abs Eosinophils  0.1 10^3/uL    0-0.6  

 

 Abs Basophils  0 10^3/uL    0-0.2  

 

 Abs Nucleated RBC  0.01 10^3/uL      

 

 Granulocyte %  73.1 %    38-83  

 

 Lymphocyte %  19.5 %  Low  25-47  

 

 Monocyte %  5.6 %    1-9  

 

 Eosinophil %  1.2 %    0-6  

 

 Basophil %  0.6 %    0-2  

 

 Nucleated Red Blood Cells %  0.1      









 Laboratory test finding  2015  Thyroid Stim Hormone  2.37 uIU/mL    0.36-
3.74  









 Free T4  1.11 ng/dL    0.76-1.46  

 

 Vitamin D,25-Hydroxy  22.8 ng/mL  Low  30.0-100.0  16









 Vitamin B12 And Folate  2015  Vitamin B12  867 pg/mL    193-986  









 Folic Acid  24.4 ng/mL  High  3.1-17.5  17









 Laboratory test finding  2015  Magnesium  1.9 mg/dL    1.8-2.4  

 

 LDL Cholesterol Profile  2015  Cholesterol  162 mg/dL    &lt; 200  18









 Triglycerides  180 mg/dL    &lt; 150  19

 

 HDL Cholesterol  63 mg/dL    &gt; 40  20

 

 LDL-Cholesterol  63 mg/dL    &lt; 100  21









 Glycohemoglobin A1c  2015  Glycohemoglobin (A1c)  12.1 %  High  4.2-6.3  
22









 eAG  301 mg/dL      









 Laboratory test finding  2015  C-Reactive  6.10 mg/L    &lt;3.0  



     Protein,Cardiac        

 

 Comprehensive Metabolic  2015  Glucose  351 mg/dL  High    



 Panel            









 BUN  15 mg/dL    7-18  

 

 Creatinine  1.0 mg/dL    0.6-1.3  

 

 Glom Filtration Rate, Estimate  &gt;60 mL/min    &gt;60  

 

 If   &gt;60 mL/min    &gt;60  23

 

 BUN/Creat  15.0 ratio      

 

 Sodium  135 mmol/L  Low  136-145  

 

 Potassium  4.9 mmol/L    3.5-5.1  

 

 Chloride  96 mmol/L  Low    

 

 Carbon Dioxide  30 mmol/L    21-32  

 

 Anion Gap  9 mEq/L    8-16  

 

 Calcium  8.8 mg/dL    8.5-10.1  

 

 Total Protein  7.0 g/dL    6.4-8.2  

 

 Albumin  3.9 g/dL    3.4-5.0  

 

 Globulin  3.1 g/dL    1.9-4.3  

 

 Alb/Glob  1.3 ratio      

 

 Bilirubin,Total  0.9 mg/dL    0.2-1.0  

 

 Sgot/Ast  7 U/L  Low  15-37  24

 

 SGPT/Alt  11 U/L  Low  12-78  25

 

 Alkaline Phosphatase  123 U/L  High    









 CBC W/Automated Diff  2015  White Blood Count  5.4 K/uL    3.1-10.7  









 Red Blood Count  4.76 M/uL    3.90-5.40  

 

 Hemoglobin  15.1 gm/dL    11.6-15.8  

 

 Hematocrit  45.1 %    36.0-46.1  

 

 Mean Cell Volume  94.7 fl    80.9-99.0  

 

 Mean Corpuscular HGB  31.7 pg    25.9-32.7  

 

 Mean Corpuscular HGB Conc  33.5 g/dL    30.8-34.3  

 

 Platelet Count  119 K/uL  Low  155-360  

 

 Red Cell Distri Width SD  48.0 fl  High  3-47  

 

 Red Cell Distri Width %CV  14.3 %    11.7-14.4  

 

 Mean Platelet Volume  12.4 fL    8.9-12.4  

 

 Neut%  67.4 %    40.4-72.8  

 

 Lymph %  24.2 %    17.0-46.1  

 

 Mono %  6.3 %    4.3-13.2  

 

 Eo%  1.7 %    0.0-6.6  

 

 Bas%  0.4 %    0.0-1.1  

 

 Neut#  3.66 K/uL    1.0-7.0  

 

 Lymph #  1.31 K/uL  Low  1.8-7.0  

 

 Mono #  0.34 K/uL    0.3-0.9  

 

 Eos #  0.09 K/uL    0.0-0.5  

 

 Baso #  0.02 K/uL    0.0-0.1  









 Laboratory test finding  2015  Cytology Pap  See Note      26

 

 Laboratory test finding  07/10/2015  Thyroid Stim Hormone  2.18 uIU/mL    0.36-
3.74  









 Magnesium  1.8 mg/dL    1.8-2.4  









 Microalbumin,Random Urine  07/10/2015  Microalbumin,Urine  &lt; 5.0    &lt; 
20.0  



       mg/L      

 

 Laboratory test finding  07/10/2015  Vitamin D,25-Hydroxy  19.8 ng/mL  Low  
30.0-100.0  27

 

 LDL Cholesterol Profile  07/10/2015  Cholesterol  139 mg/dL    &lt; 200  28









 Triglycerides  139 mg/dL    &lt; 150  29

 

 HDL Cholesterol  65 mg/dL    &gt; 40  30

 

 LDL-Cholesterol  46 mg/dL    &lt; 100  31









 Glycohemoglobin A1c  07/10/2015  Glycohemoglobin (A1c)  10.4 %  High  4.2-6.3  
32









 eAG  252 mg/dL      









 Laboratory test finding  07/10/2015  C-Reactive  5.10 mg/L    &lt;3.0  



     Protein,Cardiac        

 

 Comprehensive Metabolic  07/10/2015  Glucose  258 mg/dL  High    



 Panel            









 BUN  19 mg/dL  High  7-18  

 

 Creatinine  1.0 mg/dL    0.6-1.3  

 

 Glom Filtration Rate, Estimate  &gt;60 mL/min    &gt;60  

 

 If   &gt;60 mL/min    &gt;60  33

 

 BUN/Creat  19.0 ratio      

 

 Sodium  136 mmol/L    136-145  

 

 Potassium  4.4 mmol/L    3.5-5.1  

 

 Chloride  95 mmol/L  Low    

 

 Carbon Dioxide  35 mmol/L  High  21-32  

 

 Anion Gap  6 mEq/L  Low  8-16  

 

 Calcium  9.1 mg/dL    8.5-10.1  

 

 Total Protein  7.4 g/dL    6.4-8.2  

 

 Albumin  4.4 g/dL    3.4-5.0  

 

 Globulin  3.0 g/dL    1.9-4.3  

 

 Alb/Glob  1.5 ratio      

 

 Bilirubin,Total  0.7 mg/dL    0.2-1.0  

 

 Sgot/Ast  8 U/L  Low  15-37  34

 

 SGPT/Alt  13 U/L    12-78  

 

 Alkaline Phosphatase  114 U/L      









 CBC W/Automated Diff  07/10/2015  White Blood Count  7.2 K/uL    3.1-10.7  









 Red Blood Count  4.79 M/uL    3.90-5.40  

 

 Hemoglobin  15.4 gm/dL    11.6-15.8  

 

 Hematocrit  46.4 %  High  36.0-46.1  

 

 Mean Cell Volume  96.9 fl    80.9-99.0  

 

 Mean Corpuscular HGB  32.2 pg    25.9-32.7  

 

 Mean Corpuscular HGB Conc  33.2 g/dL    30.8-34.3  

 

 Platelet Count  134 K/uL  Low  155-360  

 

 Red Cell Distri Width SD  49.4 fl  High  3-47  

 

 Red Cell Distri Width %CV  14.0 %    11.7-14.4  

 

 Mean Platelet Volume  12.6 fL  High  8.9-12.4  

 

 Neut%  69.1 %    40.4-72.8  

 

 Lymph %  24.8 %    17.0-46.1  

 

 Mono %  4.9 %    4.3-13.2  

 

 Eo%  0.8 %    0.0-6.6  

 

 Bas%  0.4 %    0.0-1.1  

 

 Neut#  4.95 K/uL    1.0-7.0  

 

 Lymph #  1.78 K/uL  Low  1.8-7.0  

 

 Mono #  0.35 K/uL    0.3-0.9  

 

 Eos #  0.06 K/uL    0.0-0.5  

 

 Baso #  0.03 K/uL    0.0-0.1  









 CBC W/Automated Diff  2015  White Blood Count  7.3 K/uL    3.1-10.7  









 Red Blood Count  5.28 M/uL    3.90-5.40  

 

 Hemoglobin  16.4 gm/dL  High  11.6-15.8  

 

 Hematocrit  48.4 %  High  36.0-46.1  

 

 Mean Cell Volume  91.7 fl    80.9-99.0  

 

 Mean Corpuscular HGB  31.1 pg    25.9-32.7  

 

 Mean Corpuscular HGB Conc  33.9 g/dL    30.8-34.3  

 

 Platelet Count  140 K/uL  Low  155-360  

 

 Red Cell Distri Width SD  45.4 fl    3-47  

 

 Red Cell Distri Width %CV  13.6 %    11.7-14.4  

 

 Mean Platelet Volume  12.6 fL  High  8.9-12.4  

 

 Neut%  72.3 %    40.4-72.8  

 

 Lymph %  20.4 %    17.0-46.1  

 

 Mono %  5.7 %    4.3-13.2  

 

 Eo%  1.2 %    0.0-6.6  

 

 Bas%  0.4 %    0.0-1.1  

 

 Neut#  5.24 K/uL    1.0-7.0  

 

 Lymph #  1.48 K/uL    0.8-3.4  

 

 Mono #  0.41 K/uL    0.3-0.9  

 

 Eos #  0.09 K/uL    0.0-0.5  

 

 Baso #  0.03 K/uL    0.0-0.1  









 Comprehensive Metabolic Panel  2015  Glucose  370 mg/dL  High    









 BUN  15 mg/dL    7-18  

 

 Creatinine  1.0 mg/dL    0.6-1.3  

 

 Glom Filtration Rate, Estimate  &gt;60 mL/min    &gt;60  

 

 If   &gt;60 mL/min    &gt;60  35

 

 BUN/Creat  15.0 ratio      

 

 Sodium  134 mmol/L  Low  136-145  

 

 Potassium  4.4 mmol/L    3.5-5.1  

 

 Chloride  97 mmol/L  Low    

 

 Carbon Dioxide  29 mmol/L    21-32  

 

 Anion Gap  12 mEq/L    8-16  

 

 Calcium  9.0 mg/dL    8.5-10.1  

 

 Total Protein  7.3 g/dL    6.4-8.2  

 

 Albumin  4.1 g/dL    3.4-5.0  

 

 Globulin  3.2 g/dL    1.9-4.3  

 

 Alb/Glob  1.3 ratio      

 

 Bilirubin,Total  0.8 mg/dL    0.2-1.0  

 

 Sgot/Ast  9 U/L  Low  15-37  36

 

 SGPT/Alt  12 U/L    12-78  

 

 Alkaline Phosphatase  134 U/L  High    









 Laboratory test finding  2015  Sedimentation Rate  7 mm/hr    0-30  









 C-Reactive Protein,Cardiac  8.84 mg/L    &lt;3.0  









 LDL Cholesterol Profile  2015  Cholesterol  139 mg/dL    &lt; 200  37









 Triglycerides  146 mg/dL    &lt; 150  38

 

 HDL Cholesterol  56 mg/dL    &gt; 40  39

 

 LDL-Cholesterol  54 mg/dL    &lt; 100  40









 Glycohemoglobin A1c  2015  Glycohemoglobin (A1c)  10.1 %  High  4.2-6.3  
41









 eAG  243 mg/dL      









 Laboratory test finding  2015  TSH Reflex FT4 and/or  1.97 uIU/mL    0.36
-3.74  42



     FT3        

 

 Microalbumin,Random Urine  2015  Microalbumin,Random  See Note      43



     Urine        

 

 CBC W/Automated Diff  10/14/2014  White Blood Count  5.8 K/uL    3.1-10.7  









 Red Blood Count  4.77 M/uL    3.90-5.40  

 

 Hemoglobin  15.2 gm/dL    11.6-15.8  

 

 Hematocrit  44.9 %    36.0-46.1  

 

 Mean Cell Volume  94.1 fl    80.9-99.0  

 

 Mean Corpuscular HGB  31.9 pg    25.9-32.7  

 

 Mean Corpuscular HGB Conc  33.9 g/dL    30.8-34.3  

 

 Platelet Count  131 K/uL  Low  155-360  

 

 Red Cell Distri Width SD  44.5 fl    3-47  

 

 Red Cell Distri Width %CV  13.3 %    11.7-14.4  

 

 Mean Platelet Volume  12.4 fL    8.9-12.4  

 

 Neut%  74.8 %  High  40.4-72.8  

 

 Lymph %  18.4 %    17.0-46.1  

 

 Mono %  5.2 %    4.3-13.2  

 

 Eo%  1.4 %    0.0-6.6  

 

 Bas%  0.2 %    0.0-1.1  

 

 Neut#  4.30 K/uL    1.0-7.0  

 

 Lymph #  1.06 K/uL    0.8-3.4  

 

 Mono #  0.30 K/uL    0.3-0.9  

 

 Eos #  0.08 K/uL    0.0-0.5  

 

 Baso #  0.01 K/uL    0.0-0.1  









 Comprehensive Metabolic Panel  10/14/2014  Glucose  336 mg/dL  High    









 BUN  19 mg/dL  High  7-18  

 

 Creatinine  0.9 mg/dL    0.6-1.3  

 

 Glom Filtration Rate, Estimate  &gt;60 mL/min    &gt;60  

 

 If   &gt;60 mL/min    &gt;60  44

 

 BUN/Creat  21.1 ratio      

 

 Sodium  134 mmol/L  Low  136-145  

 

 Potassium  4.3 mmol/L    3.5-5.1  

 

 Chloride  98 mmol/L      

 

 Carbon Dioxide  31 mmol/L    21-32  

 

 Anion Gap  9 mEq/L    8-16  

 

 Calcium  8.9 mg/dL    8.5-10.1  

 

 Total Protein  7.2 g/dL    6.4-8.2  

 

 Albumin  4.2 g/dL    3.4-5.0  

 

 Globulin  3.0 g/dL    1.9-4.3  

 

 Alb/Glob  1.4 ratio      

 

 Bilirubin,Total  0.8 mg/dL    0.2-1.0  

 

 Sgot/Ast  13 U/L  Low  15-37  

 

 SGPT/Alt  15 U/L    12-78  

 

 Alkaline Phosphatase  126 U/L  High    









 Laboratory test finding  10/14/2014  C-Reactive Protein,Cardiac  13.00 mg/L    
&lt;3.0  45









 Sedimentation Rate  14 mm/hr    0-30  









 LDL Cholesterol Profile  10/14/2014  Cholesterol  145 mg/dL      46









 Triglycerides  146 mg/dL      47

 

 HDL Cholesterol  54 mg/dL      48

 

 LDL-Cholesterol  62 mg/dL      49









 Laboratory test finding  10/14/2014  Magnesium  1.6 mg/dL  Low  1.8-2.4  









 Thyroid Stim Hormone  1.67 uIU/mL    0.36-3.74  

 

 Free T4  1.19 ng/dL    0.76-1.46  









 Microalbumin,Random Urine  10/14/2014  Microalbumin,Urine  12.4 mg/L    &lt; 
20.0  

 

 Vitamin B12 And Folate  10/14/2014  Vitamin B12  502 pg/mL    200-900  









 Folic Acid  10.8 ng/mL    3.1-17.5  









 Laboratory test finding  10/14/2014  Uric Acid  3.5 mg/dL    2.6-6.0  

 

 Glycohemoglobin A1c  10/14/2014  Glycohemoglobin (A1c)  10.2 %  High  4.2-6.3  
50









 eAG  246 mg/dL      









 1  *******Because ethnic data is not always readily available,



   this report includes an eGFR for both -Americans and



   non- Americans.****



   The National Kidney Disease Education Program (NKDEP) does



   not endorse the use of the MDRD equation for patients that



   are not between the ages of 18 and 70, are pregnant, have



   extremes of body size, muscle mass, or nutritional status,



   or are non- or non-.



   According to the National Kidney Foundation, irrespective of



   diagnosis, the stage of the disease is based on the level of



   kidney function:



   Stage Description                      GFR(mL/min/1.73 m(2))



   1     Kidney damage with normal or decreased GFR       90



   2     Kidney damage with mild decrease in GFR          60-89



   3     Moderate decrease in GFR                         30-59



   4     Severe decrease in GFR                           15-29



   5     Kidney failure                       &lt;15 (or dialysis)

 

 2  Desirable &lt;150



   Borderline high 150-199



   High 200-499



   Very High &gt;500

 

 3  Desirable &lt;200



   Borderline high 200-239



   High &gt;239

 

 4  Low &lt;40



   Desirable: 40-60



   High: &gt;60

 

 5  Desirable: &lt;100 mg/dL



   Near Optimal: 100-129 mg/dL



   Borderline High: 130-159 mg/dL



   High: 160-189 mg/dL



   Very High: &gt;189 mg/dL

 

 6  Therapeutic target for the treatment of diabetes



   [place] patients is &lt;7% HBA1C, and in selective



   patients &lt;6.0%.Please refer to American Diabetes



   Association Diabetic care guidelines for further



   information.

 

 7  Unable to calculate due to low microalbumin

 

 8  [id number]

 

 9  Therapeutic target for the treatment of diabetes



   [facility] patients is &lt;7% HBA1C, and in selective



   patients &lt;6.0%.Please refer to American Diabetes



   Association Diabetic care guidelines for further



   information.

 

 10  Desirable &lt;150



   Borderline high 150-199



   High 200-499



   Very High &gt;500

 

 11  Desirable &lt;200



   Borderline high 200-239



   High &gt;239

 

 12  Low &lt;40



   Desirable: 40-60



   High: &gt;60

 

 13  Desirable: &lt;100 mg/dL



   Near Optimal: 100-129 mg/dL



   Borderline High: 130-159 mg/dL



   High: 160-189 mg/dL



   Very High: &gt;189 mg/dL

 

 14  Low risk: &lt;1.00



   Average risk: 1.00-3.00



   High risk: &gt;3.00

 

 15  *******Because ethnic data is not always readily available,



   this report includes an eGFR for both -Americans and



   non- Americans.****



   The National Kidney Disease Education Program (NKDEP) does



   not endorse the use of the MDRD equation for patients that



   are not between the ages of 18 and 70, are pregnant, have



   extremes of body size, muscle mass, or nutritional status,



   or are non- or non-.



   According to the National Kidney Foundation, irrespective of



   diagnosis, the stage of the disease is based on the level of



   kidney function:



   Stage Description                      GFR(mL/min/1.73 m(2))



   1     Kidney damage with normal or decreased GFR       90



   2     Kidney damage with mild decrease in GFR          60-89



   3     Moderate decrease in GFR                         30-59



   4     Severe decrease in GFR                           15-29



   5     Kidney failure                       &lt;15 (or dialysis)

 

 16  Vitamin D deficiency has been defined by the [place] of



   Medicine and an Endocrine Society practice guideline as a



   level of serum 25-OH vitamin D less than 20 ng/mL (1,2).



   The Endocrine Society went on to further define vitamin D



   insufficiency as a level between 21 and 29 ng/mL (2).



   1. IOM ([place] of Medicine). 2010. Dietary reference



   intakes for calcium and D. [place]: The



   National Academies Press.



   2. Alex MF, Chavo GILES, Feliciano THOMAS, et al.



   Evaluation, treatment, and prevention of vitamin D



   deficiency: an Endocrine Society clinical practice



   guideline. JCEM. 2011; 96(7):1911-30.



   Performed at:  RN - [facility] [address]  [id number]



   : Araceli B Reyes MD, Phone:  [phone]

 

 17  Result confirmed by repeat analysis.

 

 18  Reference Guidelines*:



   Desirable: ........... &lt; 200 mg/dL



   Borderline High: ..... 200-239 mg/dL



   High: ................ &gt;=240 mg/dL



   * The National Cholesterol Education Program (NCEP)

 

 19  Reference Guidelines*:



   Normal: ............. &lt; 150 mg/dL



   Borderline High: .... 150-199 mg/dL



   High: ............... 200-499 mg/dL



   Very High: .......... &gt; 500 mg/dL



   * Source: National Cholesterol Education Program (NCEP)

 

 20  Reference Guidelines*:



   Low HDL: ..... &lt; 40 mg/dL



   Normal:  ..... 40-60 mg/dL



   Desirable: ... &gt; 60 mg/dL



   *The National Cholesterol Education Program(NCEP)

 

 21  Reference Guidelines*:



   Optimal:........... &lt;100 mg/dL



   Near Optimal....... 100-129 mg/dL



   Borderline High.... 130-159 mg/dL



   High............... 160-189 mg/dL



   Very High.......... &gt;=190 mg/dL



   * Source: National Cholesterol Education Program (NCEP)

 

 22  Elevated levels of HbA1c suggest the need for more



   aggressive treatment of glycemia.



   The American Diabetes Association recommends that a primary



   goal of therapy should be a HbA1c of &lt;7% and that physicians



   should re-evaluate the treatment regimen in patients with



   HbA1c values consistently &gt;8%.

 

 23  Note:



   Persistent reduction for 3 months or more in an eGFR &lt;60



   mL/min/1.73 m2 defines CKD.  Patients with eGFR values &gt;/=60



   mL/min/1.73 m2 may also have CKD if evidence of persistent



   proteinuria is present.



   The original MDRD equation for estimated GFR is not valid



   for patients less than 18 years of age.



   Additional information may be found at www.kdoqi.org.

 

 24  Values below the stated reference ranges of AST and ALT can



   be seen in normal populations. Clinical correlation is



   suggested.

 

 25  Values below the stated reference ranges of AST and ALT can



   be seen in normal populations. Clinical correlation is



   suggested.

 

 26  Interpretation:



   NEGATIVE FOR INTRAEPITHELIAL LESION OR MALIGNANCY.



   



   Specimen Adequacy:



   SATISFACTORY FOR EVALUATION.



   



   



   



   



   



   



   



   



   [facility]          [address] [place] [address], [address]



   Phone [phone]          [place] [address]



   Fax      [phone]



   CYTOLOGY REPORT



   



   



   Name: RIRI MONZON Accession #: [id number] : 1955 (Age: 60) Sex: F



   Location: [facility]. # [id number]   Date Collected:



   2015 Billing #: -[id number]   Date Received: 9/3/2015     Requisition #



   [id number]       Physician(s):  ANÍBAL BARR



   Source of Specimen:  VAGINAL THIN PREP



   Clinical Information:



   Date of Last Menstrual Period:   



   Treatment History:     Hysterectomy



   



   



   



   azar ***Electronic Signature***



   RYNE Parker (ASCP) Reported: 9/10/2015 Also seen by :RYNE Ann (ASCP)



   [facility]



   



   



   ICD-9 Code(s) V72.31

 

 27  Vitamin D deficiency has been defined by the [place] of



   Medicine and an Endocrine Society practice guideline as a



   level of serum 25-OH vitamin D less than 20 ng/mL (1,2).



   The Endocrine Society went on to further define vitamin D



   insufficiency as a level between 21 and 29 ng/mL (2).



   1. IOM ([place] of Medicine). 2010. Dietary reference



   intakes for calcium and D. [place]: The



   National Academies Press.



   2. Alex MF, Chavo GILES, Feliciano THOMAS, et al.



   Evaluation, treatment, and prevention of vitamin D



   deficiency: an Endocrine Society clinical practice



   guideline. JCEM. 2011 Jul; 96(7):1911-30.



   Performed at:  [facility] - [facility] [address]  [id number]



   : Araceli B Reyes MD, Phone:  [phone]

 

 28  Reference Guidelines*:



   Desirable: ........... &lt; 200 mg/dL



   Borderline High: ..... 200-239 mg/dL



   High: ................ &gt;=240 mg/dL



   * The National Cholesterol Education Program (NCEP)

 

 29  Reference Guidelines*:



   Normal: ............. &lt; 150 mg/dL



   Borderline High: .... 150-199 mg/dL



   High: ............... 200-499 mg/dL



   Very High: .......... &gt; 500 mg/dL



   * Source: National Cholesterol Education Program (NCEP)

 

 30  Reference Guidelines*:



   Low HDL: ..... &lt; 40 mg/dL



   Normal:  ..... 40-60 mg/dL



   Desirable: ... &gt; 60 mg/dL



   *The National Cholesterol Education Program(NCEP)

 

 31  Reference Guidelines*:



   Optimal:........... &lt;100 mg/dL



   Near Optimal....... 100-129 mg/dL



   Borderline High.... 130-159 mg/dL



   High............... 160-189 mg/dL



   Very High.......... &gt;=190 mg/dL



   * Source: National Cholesterol Education Program (NCEP)

 

 32  Elevated levels of HbA1c suggest the need for more



   aggressive treatment of glycemia.



   The American Diabetes Association recommends that a primary



   goal of therapy should be a HbA1c of &lt;7% and that physicians



   should re-evaluate the treatment regimen in patients with



   HbA1c values consistently &gt;8%.

 

 33  Note:



   Persistent reduction for 3 months or more in an eGFR &lt;60



   mL/min/1.73 m2 defines CKD.  Patients with eGFR values &gt;/=60



   mL/min/1.73 m2 may also have CKD if evidence of persistent



   proteinuria is present.



   The original MDRD equation for estimated GFR is not valid



   for patients less than 18 years of age.



   Additional information may be found at www.kdoqi.org.

 

 34  Values below the stated reference ranges of AST and ALT can



   be seen in normal populations. Clinical correlation is



   suggested.

 

 35  Note:



   Persistent reduction for 3 months or more in an eGFR &lt;60



   mL/min/1.73 m2 defines CKD.  Patients with eGFR values &gt;/=60



   mL/min/1.73 m2 may also have CKD if evidence of persistent



   proteinuria is present.



   The original MDRD equation for estimated GFR is not valid



   for patients less than 18 years of age.



   Additional information may be found at www.kdoqi.org.

 

 36  Values below the stated reference ranges of AST and ALT can



   be seen in normal populations. Clinical correlation is



   suggested.

 

 37  Reference Guidelines*:



   Desirable: ........... &lt; 200 mg/dL



   Borderline High: ..... 200-239 mg/dL



   High: ................ &gt;=240 mg/dL



   * The National Cholesterol Education Program (NCEP)

 

 38  Reference Guidelines*:



   Normal: ............. &lt; 150 mg/dL



   Borderline High: .... 150-199 mg/dL



   High: ............... 200-499 mg/dL



   Very High: .......... &gt; 500 mg/dL



   * Source: National Cholesterol Education Program (NCEP)

 

 39  Reference Guidelines*:



   Low HDL: ..... &lt; 40 mg/dL



   Normal:  ..... 40-60 mg/dL



   Desirable: ... &gt; 60 mg/dL



   *The National Cholesterol Education Program(NCEP)

 

 40  Reference Guidelines*:



   Optimal:........... &lt;100 mg/dL



   Near Optimal....... 100-129 mg/dL



   Borderline High.... 130-159 mg/dL



   High............... 160-189 mg/dL



   Very High.......... &gt;=190 mg/dL



   * Source: National Cholesterol Education Program (NCEP)

 

 41  Elevated levels of HbA1c suggest the need for more



   aggressive treatment of glycemia.



   The American Diabetes Association recommends that a primary



   goal of therapy should be a HbA1c of &lt;7% and that physicians



   should re-evaluate the treatment regimen in patients with



   HbA1c values consistently &gt;8%.

 

 42  QUERY: Reflex add FT3?  N



   QUERY: Reflex add FT4?  Y

 

 43  NO URINE SENT

 

 44  Note:



   Persistent reduction for 3 months or more in an eGFR &lt;60



   mL/min/1.73 m2 defines CKD.  Patients with eGFR values &gt;/=60



   mL/min/1.73 m2 may also have CKD if evidence of persistent



   proteinuria is present.



   The original MDRD equation for estimated GFR is not valid



   for patients less than 18 years of age.



   Additional information may be found at www.kdoqi.org.

 

 45  Result confirmed by repeat analysis.

 

 46  Reference Guidelines*:



   Desirable: ........... &lt; 200 mg/dL



   Borderline High: ..... 200-239 mg/dL



   High: ................ &gt;=240 mg/dL



   * The National Cholesterol Education Program (NCEP)

 

 47  Reference Guidelines*:



   Normal: ............. &lt; 150 mg/dL



   Borderline High: .... 150-199 mg/dL



   High: ............... 200-499 mg/dL



   Very High: .......... &gt; 500 mg/dL



   * Source: National Cholesterol Education Program (NCEP)

 

 48  Reference Guidelines*:



   Low HDL: ..... &lt; 40 mg/dL



   Normal:  ..... 40-60 mg/dL



   Desirable: ... &gt; 60 mg/dL



   *The National Cholesterol Education Program(NCEP)

 

 49  Reference Guidelines*:



   Optimal:........... &lt;100 mg/dL



   Near Optimal....... 100-129 mg/dL



   Borderline High.... 130-159 mg/dL



   High............... 160-189 mg/dL



   Very High.......... &gt;=190 mg/dL



   * Source: National Cholesterol Education Program (NCEP)

 

 50  Elevated levels of HbA1c suggest the need for more



   aggressive treatment of glycemia.



   The American Diabetes Association recommends that a primary



   goal of therapy should be a HbA1c of &lt;7% and that physicians



   should re-evaluate the treatment regimen in patients with



   HbA1c values consistently &gt;8%.







Procedures







 Date  CPT Code  Description  Status

 

 2017  07788  Visual Screening Test  Completed

 

 2017  50321  EKG  Completed

 

 2017  16447  Audiometry, Bekesy, Screening  Completed

 

 2016  86003  EKG  Completed

 

 2016  14226  Visual Screening Test  Completed

 

 2016  05122  EKG  Completed

 

 2016  63338  Audiometry, Bekesy, Screening  Completed

 

 2015  21636  Spirometry  Completed

 

 2015  44956  EKG  Completed

 

 2015  17175  Tympanometry  Completed

 

 10/13/2014  86079  Visual Screening Test  Completed

 

 10/13/2014  08347  EKG  Completed

 

 10/13/2014  25509  Audiometry, Bekesy, Screening  Completed







Encounters







 Type  Date  Location  Provider  CPT E/M  Dx

 

 Office Visit  2017  2:00p  [facility]  Aníbal Wing JOIE  [address]  I10









 Z95.0

 

 I25.10

 

 E78.2

 

 E11.65









 Office Visit  10/09/2017  4:30p  [facility]  Aníbal Wing CORTNEY  [address]  I10









 Z95.0

 

 I25.10

 

 E78.2

 

 Z23

 

 M25.562









 Office Visit  2017 11:15a  [facility]  Aníbal Wing [facility]  [address]  I10









 Z95.0

 

 I25.10

 

 E78.2

 

 E11.65

 

 K21.0

 

 J30.1

 

 Z00.01









 Office Visit  2017  2:00p  [facility]  Aníbal Wing [facility]  [address]  
M25.562









 M54.5









 Office Visit  2017  2:00p  [facility]  Aníbal Wing [facility]  [address]  
M25.562









 I10

 

 Z95.0

 

 K21.0

 

 M54.5

 

 E11.65









 Office Visit  10/19/2016  1:45p  [facility]  Aníbal Wing [facility]  [address]  J30.2









 I10

 

 Z95.0

 

 K21.0

 

 M54.5

 

 E11.65

 

 E78.2

 

 Z23









 Office Visit  2016  4:30p  [facility]  Aníbal Wing [facility]  [address]  Z11.1

 

 Office Visit  2016  2:15p  [facility]  Andre Kumar M.D.  [address]  
R07.9









 R31.9

 

 L20.9

 

 J45.909

 

 J30.2

 

 I10

 

 Z95.0

 

 I49.5

 

 J44.9

 

 K30

 

 K21.0

 

 M54.5

 

 M15.9

 

 E11.65

 

 E78.2

 

 I25.2

 

 I25.10

 

 R07.89

 

 M25.512

 

 L40.9

 

 H90.3

 

 G56.02









 Office Visit  2016  9:00a  [facility]  Andre Kumar M.D.  [address]  
Z00.01









 R31.9

 

 L20.9

 

 J45.909

 

 Z68.36

 

 J30.2

 

 I10

 

 Z95.0

 

 I49.5

 

 J44.9

 

 K30

 

 K21.0

 

 M54.5

 

 M15.9

 

 E11.65

 

 E78.2

 

 I25.2

 

 I25.10

 

 R07.89

 

 M25.512

 

 L40.9

 

 H90.3









 Office Visit  2015  9:15a  [facility]  Andre Kumar M.D.  [address]  
R31.9









 L20.9

 

 J45.909

 

 J30.2

 

 I10

 

 Z95.0

 

 I49.5

 

 J44.9

 

 K30

 

 K21.0

 

 M54.5

 

 M15.9

 

 E11.65

 

 E78.2

 

 I25.2

 

 I25.10

 

 R07.89

 

 M25.512

 

 L40.9









 Office Visit  2015  9:15a  [facility]  Andre Kumar M.D.  [address]  
R31.9









 L20.9

 

 J45.909

 

 J30.2

 

 I10

 

 Z95.0

 

 I49.5

 

 J44.9

 

 K30

 

 K21.0

 

 M54.5

 

 M15.9

 

 E11.65

 

 E78.2

 

 I25.2

 

 I25.10

 

 R07.89

 

 M25.512

 

 L40.9

 

 Z12.39









 Office Visit  10/15/2015 10:00a  [facility]  Andre Kumar M.D.  [address]  
Z23









 L20.9

 

 J45.909

 

 J30.2

 

 I10

 

 Z95.0

 

 I49.5

 

 J44.9

 

 K30

 

 K21.0

 

 M54.5

 

 M15.9

 

 E11.65

 

 E78.2

 

 I25.2

 

 I25.10

 

 R07.89

 

 M25.512

 

 L40.9









 Office Visit  2015 10:45a  [facility]  Andre Kumar M.D.  [address]  
691.8









 696.8

 

 493.90

 

 477.8

 

 401.1

 

 V45.01

 

 427.81

 

 496

 

 536.8

 

 530.11

 

 724.2

 

 715.90

 

 250.02

 

 272.2

 

 412

 

 414.01

 

 786.59

 

 719.41









 Office Visit  2015  2:15p  [facility]  Aníbal Wing [facility]  [address]  V72.31









 V76.10

 

 V76.41

 

 V76.2









 Office Visit  07/10/2015  9:45a  [facility]  Andre Kumar M.D.  [address]  
691.8









 696.8

 

 493.90

 

 477.8

 

 401.1

 

 V45.01

 

 427.81

 

 496

 

 536.8

 

 530.11

 

 724.2

 

 715.90

 

 250.02

 

 272.2

 

 412

 

 414.01

 

 786.59

 

 719.41

 

 V76.12









 Office Visit  2015  4:15p  [facility]  Andre Kumar M.D.  [address]  
691.8









 696.8

 

 493.90

 

 477.8

 

 401.1

 

 V45.01

 

 427.81

 

 496

 

 536.8

 

 530.11

 

 724.2

 

 715.90

 

 250.02

 

 272.2

 

 412

 

 414.01

 

 786.59

 

 719.41









 Office Visit  2015  4:15p  [facility]  Andre Kumar M.D.  [address]  
466.0









 461.8

 

 382.9

 

 478.19

 

 786.2

 

 786.05

 

 691.8

 

 696.8

 

 493.90

 

 477.8

 

 401.1

 

 V45.01

 

 427.81

 

 496

 

 536.8

 

 530.11

 

 724.2

 

 715.90

 

 250.02

 

 272.2

 

 412

 

 414.01

 

 786.59









 Office Visit  2015  9:00a  [facility]  Andre Kumar M.D.  [id number]  
466.0









 461.8

 

 382.9

 

 478.19

 

 786.2

 

 786.05

 

 691.8

 

 696.8

 

 493.90

 

 477.8

 

 401.1

 

 V45.01

 

 427.81

 

 496

 

 536.8

 

 530.11

 

 724.2

 

 715.90

 

 250.02

 

 272.2

 

 412

 

 414.01

 

 786.59









 Office Visit  2014  4:00p  [facility]  Andre Kumar M.D.  [id number]  
786.59









 414.01

 

 412

 

 272.2

 

 250.02

 

 715.90

 

 724.2

 

 530.11

 

 536.8

 

 496

 

 427.81

 

 V45.01

 

 401.1

 

 477.8

 

 493.90

 

 696.8

 

 691.8









 Office Visit  10/21/2014  9:45a  [facility]  Andre Kumar M.D.  [address]40  
786.59









 414.01

 

 412

 

 272.2

 

 250.02

 

 715.90

 

 724.2

 

 530.11

 

 536.8

 

 496

 

 427.81

 

 V45.01

 

 401.1

 

 477.8

 

 493.90

 

 696.8

 

 691.8









 Office Visit  10/14/2014 10:30a  [facility]  Andre Kumar M.D.  [id number]  
786.59









 414.01

 

 412

 

 272.2

 

 250.02

 

 715.90

 

 724.2

 

 530.11

 

 536.8

 

 496

 

 427.81

 

 V45.01

 

 401.1

 

 477.8

 

 493.90

 

 696.8

 

 691.8









 Office Visit  10/13/2014 10:45a  [facility]  Andre Kumar M.D.  [id number]  
786.59









 414.01

 

 412

 

 272.2

 

 250.02

 

 715.90

 

 724.2

 

 V70.1

 

 530.11

 

 536.8

 

 496

 

 V85.35

 

 427.81

 

 V45.01

 

 401.1

 

 477.8

 

 493.90

 

 696.8

 

 691.8

 

 367.1

 

 V70.0

 

 V04.81







Plan of Care

Future Appointment(s):2017  2:00 pm - Aníbal Wing FNP at [facility] - Aníbal Wing [id number] Essential (primary) hypertensionComments:CHECK BP 
TIW COUNSELING ON DX AND LONG TERM CARE AND COMPLICATIONS LOW SODIUM DIET CONT 
[facility] F/UZ95.0 Presence of cardiac pacemakerComments:CONT WITH 
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 
pctrsComments:STABLEF/U WITH HDLTCGDGDSG42.2 Mixed hyperlipidemiaComments:
CONTINUE MEDICATION DIET COUNSELING WITH XRMPHAPGW81.65 Type 2 diabetes 
mellitus with hyperglycemiaComments:CONTINUE MEDICATION DIET COUNSELING WITH 
HANDOUTS   LABS DRAWN

## 2017-12-03 NOTE — RAD
INDICATION:  Right index finger infection.



TECHNIQUE: 3 views of the right index finger were obtained.



FINDINGS: On the lateral view of the finger there is evidence of soft tissue swelling

overlying the dorsal aspect of the right index finger distal interphalangeal joint. The

bones are normal alignment. Joint spaces appear maintained. No fracture is seen.



IMPRESSION:  IRREGULAR FINDINGS ARE CONSISTENT WITH SOFT TISSUE INFLAMMATORY CHANGE

WITHOUT EVIDENCE OF UNDERLYING BONY INVOLVEMENT.

## 2017-12-03 NOTE — ED
Skin Complaint





- HPI Summary


HPI Summary: 


62F presents with right index finger swelling and redness for week and a half a 

go.  She had a paronchyia which drained with needle a week ago and placed on 

keflex and bactrim for a week on .  She states swelling increased over the 

extensor surface of her right index finger. She then saw her primary and was 

placed on levaquin and keflex and the swelling has continued to increase over 

the weekend.  She has been having pus drainage from area. is diabetic. no 

history of MRSA. 








- History of Current Complaint


Chief Complaint: EDExtremityUpper


Time Seen by Provider: 17 11:35


Stated Complaint: RT FINGER SWELLING


Pain Intensity: 9





- Allergy/Home Medications


Allergies/Adverse Reactions: 


 Allergies











Allergy/AdvReac Type Severity Reaction Status Date / Time


 


Aspirin [ASA] Allergy Intermediate GI Upset Verified 17 14:57


 


Ibuprofen [From Motrin] Allergy Intermediate GI Upset Verified 17 14:57


 


Sulfa Drugs Allergy Intermediate GI Upset Verified 17 14:57


 


Camphor [From Vicks Vaporub] Allergy  "LUNGS Verified 17 14:57





   TIGHTEN UP"  


 


Eucalyptus Oil Allergy  "LUNGS Verified 17 14:57





[From Vicks Vaporub]   TIGHTEN UP"  


 


Latex Allergy  red and Verified 17 14:57





   itchy  


 


Menthol [From Vicks Vaporub] Allergy  "LUNGS Verified 17 14:57





   TIGHTEN UP"  


 


adhesive tape Allergy Intermediate Rash Uncoded 17 14:57


 


Mints Allergy  Coughing Uncoded 17 14:57














PMH/Surg Hx/FS Hx/Imm Hx


Endocrine/Hematology History: Reports: Hx Diabetes


Cardiovascular History: Reports: Hx Angina, Hx Coronary Artery Disease - 2 

STENTS PLACED-2013, Hx Hypercholesterolemia, Hx Hypertension, Hx Myocardial 

Infarction, Hx Pacemaker/ICD, Other Cardiovascular Problems/Disorders - cardiac 

arrest this admission


Respiratory History: Reports: Hx Asthma


   Denies: Hx Chronic Obstructive Pulmonary Disease (COPD)


GI History: Reports: Hx Gastroesophageal Reflux Disease - ON MEDICATION FOR


 History: Reports: Hx Kidney Stones, Other  Problems/Disorders - had stent 

placed by Dr Bar


Musculoskeletal History: Reports: Hx Arthritis


Sensory History: Reports: Hx Cataracts - RIGHT, Hx Contacts or Glasses - GLASSES


   Denies: Hx Hearing Aid


Opthamlomology History: Reports: Hx Cataracts - RIGHT, Hx Contacts or Glasses - 

GLASSES





- Cancer History


Hx Chemotherapy: No





- Surgical History


Surgery Procedure, Year, and Place:  X 3SHOCKWAVE LITHOTRIPSY-

CMCCARDIAC STENTS- CMCOophorectomyCARDIAC STENTS AND PACEMAKER INSERTION, AUG 

2013


Hx Anesthesia Reactions: No


Infectious Disease History: No


Infectious Disease History: 


   Denies: Traveled Outside the [place] in Last 30 Days





- Family History


Known Family History: 


   Negative: Cardiac Disease, Hypertension, Diabetes





- Social History


Alcohol Use: None


Substance Use Type: Reports: None


Smoking Status (MU): Former Smoker


Type: Cigarettes


Amount Used/How Often: 1 PPD


Length of Time of Smoking/Using Tobacco: 44 Years





Review of Systems


Negative: Fever


Negative: Chest Pain


Negative: Shortness Of Breath


Positive: Edema - right index finger


All Other Systems Reviewed And Are Negative: Yes





Physical Exam


Triage Information Reviewed: Yes


Vital Signs On Initial Exam: 


 Initial Vitals











Temp Pulse Resp BP Pulse Ox


 


 97.0 F   72   16   110/72   98 


 


 17 11:41  17 11:41  17 11:41  17 11:41  17 11:41











Vital Signs Reviewed: Yes


Appearance: Positive: Well-Appearing


Skin: Positive: Other - erythema over dorsum of right index finger


Head/Face: Positive: Normal Head/Face Inspection


Eyes: Positive: Normal, Conjunctiva Clear


Respiratory/Lung Sounds: Positive: Clear to Auscultation, Breath Sounds Present


Cardiovascular: Positive: Normal, RRR


Musculoskeletal: Positive: Other - able to extend finger, unable to flex finger

, nontender over flexor tendon. no palmar involvement. has popped blister type 

lesion near nailbed with pus drainage, capillary refill<2secs


Neurological: Positive: Normal


Psychiatric: Positive: Normal





- [place] Coma Scale


Coma Scale Total: 15





Procedures





- Incision and Drainage


Site: right index finger


Anesthesia: Digital


Instrument(s): Scalpel





Diagnostics





- Vital Signs


 Vital Signs











  Temp Pulse Resp BP Pulse Ox


 


 17 14:48  98.3 F  65  16  119/63  98


 


 17 11:41  97.0 F  72  16  110/72  98














- Laboratory


Lab Results: 


 Lab Results











  17 Range/Units





  12:18 12:18 12:18 


 


WBC  8.4    (3.5-10.8)  10^3/ul


 


RBC  4.73    (4.0-5.4)  10^6/ul


 


Hgb  14.5    (12.0-16.0)  g/dl


 


Hct  42    (35-47)  %


 


MCV  89    (80-97)  fL


 


MCH  31    (27-31)  pg


 


MCHC  34    (31-36)  g/dl


 


RDW  13    (10.5-15)  %


 


Plt Count  173    (150-450)  10^3/ul


 


MPV  10    (7.4-10.4)  um3


 


Neut % (Auto)  73.9    (38-83)  %


 


Lymph % (Auto)  19.3 L    (25-47)  %


 


Mono % (Auto)  5.4    (1-9)  %


 


Eos % (Auto)  0.8    (0-6)  %


 


Baso % (Auto)  0.6    (0-2)  %


 


Absolute Neuts (auto)  6.2    (1.5-7.7)  10^3/ul


 


Absolute Lymphs (auto)  1.6    (1.0-4.8)  10^3/ul


 


Absolute Monos (auto)  0.5    (0-0.8)  10^3/ul


 


Absolute Eos (auto)  0.1    (0-0.6)  10^3/ul


 


Absolute Basos (auto)  0.1    (0-0.2)  10^3/ul


 


Absolute Nucleated RBC  0.01    10^3/ul


 


Nucleated RBC %  0.1    


 


Sodium   132 L   (133-145)  mmol/L


 


Potassium   4.8   (3.5-5.0)  mmol/L


 


Chloride   98 L   (101-111)  mmol/L


 


Carbon Dioxide   23   (22-32)  mmol/L


 


Anion Gap   11   (2-11)  mmol/L


 


BUN   22   (6-24)  mg/dL


 


Creatinine   1.26 H   (0.51-0.95)  mg/dL


 


Est GFR ( Amer)   55.7   (>60)  


 


Est GFR (Non-Af Amer)   43.3   (>60)  


 


BUN/Creatinine Ratio   17.5   (8-20)  


 


Glucose   220 H   ()  mg/dL


 


Lactic Acid    2.9 H*  (0.5-2.0)  mmol/L


 


Calcium   9.7   (8.6-10.3)  mg/dL


 


Total Bilirubin   0.50   (0.2-1.0)  mg/dL


 


AST   11 L   (13-39)  U/L


 


ALT   5 L   (7-52)  U/L


 


Alkaline Phosphatase   98   ()  U/L


 


Total Protein   7.3   (6.4-8.9)  g/dL


 


Albumin   4.0   (3.2-5.2)  g/dL


 


Globulin   3.3   (2-4)  g/dL


 


Albumin/Globulin Ratio   1.2   (1-3)  











Result Diagrams: 


 17 12:18





 17 12:18


Lab Statement: Any lab studies that have been ordered have been reviewed, and 

results considered in the medical decision making process.





Course/Dx





- Course


Course Of Treatment: 62F presents with right index finger swelling and redness 

for week and a half a go.  She had a paronchyia which drained with needle a 

week ago and placed on keflex and bactrim for a week.  She states swelling 

increased over the extensor surface of her right index finger. She then saw her 

primary and was placed on levaquin and keflex and the swelling has continued to 

increase over the weekend.  She has been having pus drainage from area. on exam 

has erythema and edema from nailbed to PIP, has popped blister like area near 

nailbed with pus like drainage from area. patient able to extend fingers, put 

unable to flex fingers, pain with flexion, no palmar involvement. afebrile. wbc 

normal. lactic 2.9. gave dose of levaquin here. gave fluids. attempted I&D no 

drainage per dr capps suggestion. discussed with dr jefry rae. dr rae says that need to switch antibiotic to amoxicillin and clindamycin and 

follow up with ortho and suggested warm soaks 4 times a day. warned of signs to 

return to ED for. vishal palacio and agrees with plan.





- Differential Diagnoses - Skin Complaint


Differential Diagnoses: Abscess, Cellulitis, Systemic Illness





- Diagnoses


Provider Diagnoses: 


 Abscess of right index finger








- Physician Notifications


Discussed Care Of Patient With: dr rae


Time Discussed With Above Provider: 15:46 - discharge on augmentin and 

clindamycin, follow up in 2 days, warm soaks 4 times a day





Discharge





- Discharge Plan


Condition: Good


Disposition: HOME


Prescriptions: 


Amoxicillin/Clavulanate TAB* [Augmentin *] 875 mg PO BID #20 tab


Clindamycin Cap(NF) [Clindamycin Cap 300 mg Cap(NF)] 300 mg PO TID #29 cap


oxyCODONE/Acetamin 5/325 MG* [Percocet 5/325 TAB*] 1 tab PO Q6H PRN #8 tab MDD 4


 PRN Reason: Pain


Patient Education Materials:  Abscess (ED)


Referrals: 


Andre Kumar MD [Primary Care Provider] - 


Jd Rae MD [Medical Doctor] - 


Additional Instructions: 


Take clindamycin three times a day for 10 days, first dose given in ED


Take augmentin twice a day for 10 days, first dose given in ED


Warm soaks 4 times a day 


Take tyenlol every 6 hours, use narcotic for break through pain


Follow up with primary or ortho in 2 days


Return to ED if develop fever, area of redness spreads, or any new or worsening 

symptoms

## 2017-12-03 NOTE — XMS REPORT
Riri Way

 Created on:2017



Patient:Riri Way

Sex:Female

:1955

External Reference #:[id number]





Demographics







 Address  [address] [place] [address]

 

 Mobile Phone  [phone]

 

 Preferred Language  English

 

 Marital Status  Not  Or 

 

 Restorationism Affiliation  Unknown

 

 Race  White

 

 Ethnic Group  Not  Or 









Author







 Organization  [facility]

 

 Address  [address]



   [place] [address]

 

 Phone  [phone]









Support







 Name  Relationship  Address  Phone

 

 Thomas Carter  Sibling  Unavailable  [phone]









Care Team Providers







 Name  Role  Phone

 

 Andre Kumar MD  Care Team Information   Unavailable









Payers







 Type  Date  Identification Numbers  Payment Provider  Subscriber

 

 Commercial  Effective:  Policy Number: [id number]  [facility]  Riri [address]



   10/13/2014      









 PayID: [id number]  [address] [place] [address]







Problems







 Date  Description  Provider  Status

 

 Onset: 10/13/2014  Myopia  Andre Kumar M.D.  Active

 

 Onset: 10/13/2014  Atopic dermatitis  Andre Kumar M.D.  Active

 

 Onset: 10/13/2014  Psoriasis  Andre Kumar M.D.  Active

 

 Onset: 10/13/2014  Asthma without status asthmaticus  Andre Kumar M.D.  
Active

 

 Onset: 10/13/2014  Allergic rhinitis  Andre Kumar M.D.  Active

 

 Onset: 10/13/2014  Benign essential hypertension  Andre Kumar M.D.  Active

 

 Onset: 10/13/2014  Cardiac pacemaker in situ  Andre Kumar M.D.  Active

 

 Onset: 10/13/2014  Sinus node dysfunction  Andre Kumar M.D.  Active

 

 Onset: 10/13/2014  Chronic obstructive lung disease  Andre Kumar M.D.  
Active

 

 Onset: 10/13/2014  Indigestion  Andre Kumar M.D.  Active

 

 Onset: 10/13/2014  Peptic reflux disease  Andre Kumar M.D.  Active

 

 Onset: 10/13/2014  Low back pain  Andre Kumar M.D.  Active

 

 Onset: 10/13/2014  Osteoarthritis  Andre Kumar M.D.  Active

 

 Onset: 10/13/2014  Type II diabetes mellitus uncontrolled  [facility]re Kumar M.D.  Active

 

 Onset: 10/13/2014  Mixed hyperlipidemia  Andre Kumar M.D.  Active

 

 Onset: 10/13/2014  Old myocardial infarction  Andre Kumar M.D.  Active

 

 Onset: 10/13/2014  Coronary arteriosclerosis  [facility]re [facility] M.D.  Active







Family History







 Date  Family Member(s)  Problem(s)  Comments

 

   Father   due to he passed when pt  ()



     was 18 months old  

 

   Father  Alcoholism  

 

   Mother   due to a tage 69  ()

 

   Mother   due to Diabetes  ()

 

   First Son  mental disorders various d/t  



     brain injury  

 

   First Son  37  

 

   Second Son  dyslexia  

 

   Second Son  33  

 

   Second Son  lazy eye  

 

   Third Son  strong mood swings  

 

   Third Son  30  

 

   First Brother  Age is Unknown  

 

   First Brother  Cancer  

 

   First Brother   due to Cancer  ()

 

   Second Brother   due to passed  ()

 

   Second Brother  Age is Unknown  

 

   Second Brother   due to Diabetes  ()

 

   Second Brother   due to kidney failure  ()

 

   Third Brother   due to passed as infant  () - " blue baby"



       heart failure

 

   Third Brother  Age is Unknown  

 

   Fourth Brother  Age is Unknown  

 

   Fourth Brother  Diabetes  

 

   Fifth Brother  Age is Unknown  

 

   Fifth Brother  Diabetes  







Social History







 Type  Date  Description  Comments

 

 Marital Status    Legal Status:   

 

 ETOH Use    Denies alcohol use  

 

 Smoking    Patient is a former smoker  

 

 Daily Caffeine    Consumes on average 2 cups of regular coffee per day  







Allergies, Adverse Reactions, Alerts







 Date  Description  Reaction  Status  Severity  Comments

 

 10/13/2014  Latex    active    

 

 10/13/2014  Aspirin    active    

 

 10/13/2014  Sulfa Antibiotics    active    

 

 10/13/2014  Nuts    active    

 

 10/13/2014  Vicksvapor Rub    active    

 

 10/13/2014  Mornix    active    

 

 2017  Lisinopril    active    

 

 2017  Bandaids  Urticaria  active    







Medications







 Medication  Date  Status  Form  Strength  Qnty  SIG  Indications  Ordering



                 Provider

 

 Levaquin    Hx  Tablets  750mg  10tabs  1 by  L03.011  José,



   017 -          mouth    Ahmad M.,



   12/10/2          every    M.D.



   017          day    

 

 Loratadine    Active  Tablets  10mg  30tabs  Take 1  J30.9  José,



   017          Tablet    Ahmad M.,



             By Mouth    M.D.



             Every    



             Day as    



             Needed    

 

 Glipizide    Active  Tablets  10mg  90tabs  Take 1  E11.65  José,



   017          Tablet    Ahmad M.,



             By Mouth    M.D.



             3 Times    



             A Day    

 

 Tramadol HCL    Active  Tablets  50mg  90tabs  1 by  M25.562  José,



   017          mouth    Ahmad M.,



             three    M.D.



             times a    



             day    

 

 Naproxen    Active  Tablets  250mg  60tabs  Take 1    José,



   017          Tablet    Ahmad M.,



             By Mouth    M.D.



             Twice A    



             Day    

 

 Advair Diskus    Active  Aerosol  250-50mcg/D  60units  inhale 1    
José,



   015      ose    puff by    Ahmad M.,



             mouth    M.D.



             twice    



             daily    

 

 Atorvastatin  0  Active  Tablets  20mg      E78.2  Unknown



 Calcium  000              









 I25.10

 

 I25.2









 Furosemide    Active  Tablets  20mg    Take 1 Tablet By Mouth  
I25.10  Unknown



             Every Day    









 R60.0









 Clopidogrel Bisulfate    Active  Tablets  75mg    Take 1 Tablet By  
I25.2  Unknown



             Mouth Every Day    









 I25.10









 Proair HFA    Active  Aerosol  108(90Base)  2Mdi  use 2  J45.909  
José,



         mcg/Act    puffs one    Ahmad M.,



             to four    M.D.



             times    



             daily    









 J44.9









 Diovan    Active  Tablets  80mg    Take 1 Tablet By Mouth  I25.10  
Unknown



             Every Day    

 

 Nitrostat    Active  Tablets Sub  0.4mg    Place 1 Tablet Under  
I25.10  Unknown



             The Tongue as Directed    









 I25.2









 Metformin HCL    Active  Tablets  850mg  90tabs  take 1  E11.65  
José,



             tablet by    Ahmad M.,



             mouth 3    M.D.



             times a    



             day    

 

 Pantoprazole    Active  Tablets DR  40mg  30tabs  take 1    José,



 Sodium            tablet by    Andre HICKEY.,



             mouth    M.D.



             every day    

 

 Gabapentin    Active  Tablets  600mg  90tabs  take 1  M54.5  José,



             tablet by    Ahmaharriet M.,



             mouth 3    M.D.



             times a    



             day    









 M15.9









 Metoprolol    Active  Tablets ER  25mg    1 by  I25.10  Unknown



 Succinate ER      24HR      mouth    



             every    



             day    

 

 Aspirin Adult Low    Active  Tablets DR  81mg    1 by    Unknown



 Dose            mouth    



             every    



             day    

 

 Aldactone    Active  Tablets  25mg    1/2 tab    Unknown



             qd    

 

                 

 

 Januvia  2017 -  Hx  Tablets  100mg  Samples  1 tabs    [facility],



   2017          by mouth    Georgettemaharriet HICKEY.,



             every    M.D.



             day    

 

 Clarithromycin  2015 -  Hx  Tablets  500mg  28tabs  1 tab by    [facility],



   2015          mouth    Georgettemaharriet PITTMAN,



             twice a    M.D.



             day    

 

 Glipizide   -  Hx  Tablets  10mg      250.02  Unknown



   10/21/2014              

 

 Pantoprazole   -  Hx  Tablets DR  40mg    Take 1    Unknown



 Sodium  10/13/2014          Tablet    



             By Mouth    



             Every    



             Day    

 

 Metoprolol   -  Hx  Tablets ER  50mg    Take 1  I25.10  Unknown



 Succinate ER  2017    24HR      Tablet    



             By Mouth    



             Every    



             Day    









 M25.562









 Loratadine   -  Hx  Tablets  10mg    take 1 tablet  477.8  José, 
[facility]



   2015          by mouth every    M., M.D.



             day    

 

 Valsartan   -  Hx  Tablets  80mg      414.01  Adam,



   10/13/2014              MD Pineda









 412

 

 401.1









 Meloxicam   -  Hx  Tablets  7.5mg    Take One Tablet    Unknown



   10/13/2014          By Mouth Daily    

 

 Ranitidine HCL   -  Hx  Tablets  300mg        Tk,



   10/13/2014              MD Annika

 

 Cephalexin   -  Hx  Capsules  500mg        Unknown



   10/13/2014              

 

 Amiodarone HCL   -  Hx  Tablets  200mg    Take 1 Tablet    Unknown



   10/13/2014          By Mouth Twice    



             A Day For 7    



             Days, Then    



             Decrease To 1    



             Tabl    

 

 Metformin HCL   -  Hx  Tablets  1000mg        Unknown



   10/13/2014              

 

 [facility] Acid Reducer   -  Hx  Tablets  150mg        Tk,



 Maximum Strength  10/13/2014              MD Annika

 

 [facility] Loratadine   -  Hx  Tablets  10mg    Take 1 Tablet    Unknown



   10/13/2014          By Mouth Every    



             Day    

 

 Losartan   -  Hx  Tablets  25mg    take 1 tablet    JoséSherrie diad



 Potassium  10/13/2014          by mouth every    M., M.D.



             day    

 

 [facility] Acid Reducer   -  Hx  Tablets  150mg        Tk,



 Maximum Strength  2015              MD Annika







Medications Administered in Office







 Medication  Date  Status  Form  Strength  Qnty  SIG  Indications  Ordering



                 Provider

 

 Intradermal    Administered  Injection          White,



 [place]  016              Aníbal FNP







Immunizations







 CPT Code  Status  Date  Vaccine  Lot #

 

 [id number]  Given  10/09/2017  Flu Vaccine  [id number]

 

 03498  Given  10/19/2016  Pneumovax  [id number]

 

 76476  Given  10/15/2015  Flu Vaccine  [id number]

 

 [id number]  Given  10/13/2014  Flu Vaccine  [id number]







Vital Signs







 Date  Vital  Result  Comment

 

 2017  BP Systolic  120 mmHg  









 BP Diastolic  68 mmHg  

 

 Height  66 inches  5'6"

 

 Weight  193.00 lb  

 

 BMI (Body Mass Index)  31.1 kg/m2  

 

 Heart Rate  71 /min  

 

 Respiratory Rate  16 /min  









 2017  BP Systolic  122 mmHg  









 BP Diastolic  66 mmHg  

 

 Height  66 inches  5'6"

 

 Weight  197.00 lb  

 

 BMI (Body Mass Index)  31.8 kg/m2  

 

 Heart Rate  78 /min  

 

 Respiratory Rate  16 /min  









 10/09/2017  BP Systolic  130 mmHg  









 BP Diastolic  68 mmHg  

 

 Weight  197.00 lb  

 

 Heart Rate  77 /min  

 

 Respiratory Rate  16 /min  









 2017  BP Systolic  110 mmHg  









 BP Diastolic  62 mmHg  

 

 Height  66 inches  5'6"

 

 Weight  195.00 lb  

 

 BMI (Body Mass Index)  31.5 kg/m2  

 

 Heart Rate  63 /min  

 

 Respiratory Rate  16 /min  









 2017  BP Systolic  110 mmHg  









 BP Diastolic  72 mmHg  

 

 Height  66 inches  5'6"

 

 Weight  194.00 lb  

 

 BMI (Body Mass Index)  31.3 kg/m2  

 

 Heart Rate  62 /min  

 

 Respiratory Rate  18 /min  









 2017  BP Systolic  124 mmHg  









 BP Diastolic  62 mmHg  

 

 Height  66 inches  5'6"

 

 Weight  196.00 lb  

 

 BMI (Body Mass Index)  31.6 kg/m2  

 

 Heart Rate  72 /min  

 

 Respiratory Rate  16 /min  









 10/19/2016  BP Systolic  126 mmHg  









 BP Diastolic  74 mmHg  

 

 Height  66 inches  5'6"

 

 Weight  209.00 lb  

 

 BMI (Body Mass Index)  33.7 kg/m2  

 

 Heart Rate  68 /min  

 

 Respiratory Rate  18 /min  









 2016  BP Systolic  120 mmHg  









 BP Diastolic  62 mmHg  

 

 Height  66 inches  5'6"

 

 Weight  222.00 lb  

 

 BMI (Body Mass Index)  35.8 kg/m2  

 

 Heart Rate  84 /min  

 

 Respiratory Rate  18 /min  









 2016  BP Systolic  119 mmHg  









 BP Diastolic  76 mmHg  

 

 Height  66 inches  5'6"

 

 Weight  225.00 lb  

 

 BMI (Body Mass Index)  36.3 kg/m2  

 

 Heart Rate  75 /min  

 

 Respiratory Rate  18 /min  









 2016  BP Systolic  122 mmHg  









 BP Diastolic  70 mmHg  

 

 Height  66 inches  5'6"

 

 Weight  226.00 lb  

 

 BMI (Body Mass Index)  36.5 kg/m2  

 

 Heart Rate  71 /min  

 

 Body Temperature  96.1 F  

 

 Respiratory Rate  18 /min  









 2015  BP Systolic  140 mmHg  









 BP Diastolic  77 mmHg  

 

 Height  66 inches  5'6"

 

 Weight  228.00 lb  

 

 BMI (Body Mass Index)  36.8 kg/m2  

 

 Heart Rate  80 /min  

 

 Respiratory Rate  18 /min  









 2015  BP Systolic  123 mmHg  









 BP Diastolic  78 mmHg  

 

 Height  66 inches  5'6"

 

 Weight  227.00 lb  

 

 BMI (Body Mass Index)  36.6 kg/m2  

 

 Heart Rate  63 /min  

 

 Body Temperature  97.0 F  

 

 Respiratory Rate  18 /min  









 10/15/2015  BP Systolic  122 mmHg  









 BP Diastolic  76 mmHg  

 

 Height  66 inches  5'6"

 

 Weight  222.00 lb  

 

 BMI (Body Mass Index)  35.8 kg/m2  

 

 Heart Rate  67 /min  

 

 Respiratory Rate  18 /min  









 2015  BP Systolic  11 mmHg  









 BP Diastolic  75 mmHg  

 

 Height  66 inches  5'6"

 

 Weight  215.00 lb  

 

 BMI (Body Mass Index)  34.7 kg/m2  

 

 Heart Rate  65 /min  

 

 Respiratory Rate  16 /min  









 2015  BP Systolic  125 mmHg  









 BP Diastolic  78 mmHg  

 

 Height  66 inches  5'6"

 

 Weight  211.00 lb  

 

 BMI (Body Mass Index)  34.1 kg/m2  

 

 Heart Rate  61 /min  

 

 Respiratory Rate  15 /min  









 07/10/2015  BP Systolic  105 mmHg  









 BP Diastolic  64 mmHg  

 

 Height  66 inches  5'6"

 

 Weight  217.00 lb  

 

 BMI (Body Mass Index)  35.0 kg/m2  

 

 Heart Rate  61 /min  

 

 Respiratory Rate  16 /min  









 2015  BP Systolic  106 mmHg  









 BP Diastolic  66 mmHg  

 

 Height  66 inches  5'6"

 

 Weight  220.00 lb  

 

 BMI (Body Mass Index)  35.5 kg/m2  

 

 Heart Rate  62 /min  

 

 Respiratory Rate  15 /min  









 2015  BP Systolic  132 mmHg  









 BP Diastolic  74 mmHg  

 

 Height  66 inches  5'6"

 

 Weight  220.00 lb  

 

 BMI (Body Mass Index)  35.5 kg/m2  

 

 Heart Rate  62 /min  

 

 Respiratory Rate  15 /min  









 2015  BP Systolic  123 mmHg  









 BP Diastolic  73 mmHg  

 

 Height  66 inches  5'6"

 

 Weight  222.00 lb  

 

 BMI (Body Mass Index)  35.8 kg/m2  

 

 Heart Rate  62 /min  

 

 Body Temperature  97.4 F  

 

 Respiratory Rate  15 /min  









 2014  BP Systolic  144 mmHg  









 BP Diastolic  81 mmHg  

 

 Height  66 inches  5'6"

 

 Weight  227.00 lb  

 

 BMI (Body Mass Index)  36.6 kg/m2  

 

 Heart Rate  60 /min  

 

 Respiratory Rate  18 /min  









 10/21/2014  BP Systolic  128 mmHg  









 BP Diastolic  64 mmHg  

 

 Height  66 inches  5'6"

 

 Weight  221.00 lb  

 

 BMI (Body Mass Index)  35.7 kg/m2  

 

 Heart Rate  61 /min  

 

 Respiratory Rate  18 /min  









 10/14/2014  BP Systolic  127 mmHg  









 BP Diastolic  66 mmHg  

 

 Height  66 inches  5'6"

 

 Weight  220.00 lb  

 

 BMI (Body Mass Index)  35.5 kg/m2  

 

 Heart Rate  67 /min  

 

 Respiratory Rate  18 /min  









 10/13/2014  BP Systolic  138 mmHg  









 BP Diastolic  72 mmHg  

 

 Height  66 inches  5'6"

 

 Weight  220.00 lb  

 

 BMI (Body Mass Index)  35.5 kg/m2  

 

 Heart Rate  60 /min  

 

 Body Temperature  96.4 F  

 

 Respiratory Rate  18 /min  







Results







 Test  Date  Test  Result  H/L  Range  Note

 

 Laboratory test finding  2017  Wound  SEE RESULT BELOW      1, 2



     Culture/Sensi        









 MRSA/S. aureus Ssti PCR  SEE RESULT BELOW      1, 3









 CBC Auto Diff  2017  White Blood Count  6.5 10^3/uL    3.5-10.8  









 Red Blood Count  4.87 10^6/uL    4.0-5.4  

 

 Hemoglobin  15.1 g/dL    12.0-16.0  

 

 Hematocrit  44 %    35-47  

 

 Mean Corpuscular Volume  90 fL    80-97  

 

 Mean Corpuscular Hemoglobin  31 pg    27-31  

 

 Mean Corpuscular HGB Conc  34 g/dL    31-36  

 

 Red Cell Distribution Width  13 %    10.5-15  

 

 Platelet Count  125 10^3/uL  Low  150-450  

 

 Mean Platelet Volume  11 um3  High  7.4-10.4  

 

 Abs Neutrophils  4.0 10^3/uL    1.5-7.7  

 

 Abs Lymphocytes  1.9 10^3/uL    1.0-4.8  

 

 Abs Monocytes  0.4 10^3/uL    0-0.8  

 

 Abs Eosinophils  0.2 10^3/uL    0-0.6  

 

 Abs Basophils  0 10^3/uL    0-0.2  

 

 Abs Nucleated RBC  0 10^3/uL      

 

 Granulocyte %  61.6 %    38-83  

 

 Lymphocyte %  29.0 %    25-47  

 

 Monocyte %  5.6 %    1-9  

 

 Eosinophil %  3.1 %    0-6  

 

 Basophil %  0.7 %    0-2  

 

 Nucleated Red Blood Cells %  0.1      









 Comp Metabolic Panel  2017  Sodium  136 mmol/L    133-145  









 Potassium  4.6 mmol/L    3.5-5.0  

 

 Chloride  99 mmol/L  Low  101-111  

 

 Co2 Carbon Dioxide  29 mmol/L    22-32  

 

 Anion Gap  8 mmol/L    2-11  

 

 Glucose  207 mg/dL  High    

 

 Blood Urea Nitrogen  26 mg/dL  High  6-24  

 

 Creatinine  0.93 mg/dL    0.51-0.95  

 

 BUN/Creatinine Ratio  28.0  High  8-20  

 

 Calcium  9.5 mg/dL    8.6-10.3  

 

 Total Protein  6.6 g/dL    6.4-8.9  

 

 Albumin  4.1 g/dL    3.2-5.2  

 

 Globulin  2.5 g/dL    2-4  

 

 Albumin/Globulin Ratio  1.6    1-3  

 

 Total Bilirubin  0.80 mg/dL    0.2-1.0  

 

 Alkaline Phosphatase  82 U/L      

 

 Alt  6 U/L  Low  7-52  

 

 Ast  12 U/L  Low  13-39  

 

 Egfr Non-  61.5    &gt;60  

 

 Egfr   79.1    &gt;60  4









 CKMB  2017  CKMB  ng/mL  2.0 ng/mL    0.6-6.3  

 

 Lipid Profile (Trig/Chol/HDL)  2017  Triglycerides  153 mg/dL      5









 Cholesterol  128 mg/dL      6

 

 HDL Cholesterol  48.0 mg/dL      7

 

 LDL Cholesterol  49 mg/dL      8









 Laboratory test  2017  Hemoglobin A1c  10.0 %  High  Less than 6.0  9



 finding    (Glyco HGB)        

 

 Urine Microalbumin  2017  Urine Creatinine  71.34 mg/dL      



 Random            









 Ur Microalbumin (mg/L)  &lt; 15.0 mg/L      

 

 Urine Microalbumin/Creatinine  TNP ug/mg    &lt;31  10









 Laboratory test finding  2017  TSH (Thyroid Stim  2.83 mcIU/mL    0.34-
5.60  11



     Horm)        

 

 Iron &amp; Iron Binding  2017  Iron  165 g/dL      



 Capacity            









 Unsaturated Iron Binding  163 g/dL      

 

 Total Iron Binding Capacity  328 g/dL    250-450  

 

 % Iron Saturation  50 %    15-55  









 Laboratory test finding  2016  Magnesium  1.5 mg/dL  Low  1.9-2.7  









 TSH (Thyroid Stim Horm)  2.73 ?IU/mL    0.34-5.60  

 

 Free T4 (Free Thyroxine)  1.10 ng/mL    0.61-1.12  









 Urine Microalbumin Random  2016  Ur Microalbumin (mg/L)  10.0 mg/L      









 Urine Creatinine  115.43 mg/dL      

 

 Urine Microalbumin/Creatinine  8.6 ug/mg    &lt;31  









 Laboratory test finding  2016  Uric Acid  5.6 mg/dL    2.3-6.6  









 Hemoglobin A1c (Glyco HGB)  10.5 %  High  Less than 6.0  12

 

 Vitamin D Total 25(Oh)  21.2 ng/mL  Low  30-50  









 Lipid Profile (Trig/Chol/HDL)  2016  Triglycerides  114 mg/dL      13









 Cholesterol  117 mg/dL      14

 

 HDL Cholesterol  52.9 mg/dL      15

 

 LDL Cholesterol  41 mg/dL      16









 Laboratory test finding  2016  CRP High Sensitivity  7.73 mg/L      17

 

 Comp Metabolic Panel  2016  Sodium  134 mmol/L    133-145  









 Potassium  4.3 mmol/L    3.5-5.0  

 

 Chloride  95 mmol/L  Low  101-111  

 

 Co2 Carbon Dioxide  27 mmol/L    22-32  

 

 Anion Gap  12 mmol/L  High  2-11  

 

 Glucose  307 mg/dL  High    

 

 Blood Urea Nitrogen  19 mg/dL    6-24  

 

 Creatinine  0.95 mg/dL    0.51-0.95  

 

 BUN/Creatinine Ratio  20.0    8-20  

 

 Calcium  9.4 mg/dL    8.6-10.3  

 

 Total Protein  6.9 g/dL    6.4-8.9  

 

 Albumin  4.6 g/dL    3.2-5.2  

 

 Globulin  2.3 g/dL    2-4  

 

 Albumin/Globulin Ratio  2.0    1-3  

 

 Total Bilirubin  1.10 mg/dL  High  0.2-1.0  

 

 Alkaline Phosphatase  89 U/L      

 

 Alt  10 U/L    7-52  

 

 Ast  19 U/L    13-39  

 

 Egfr Non-  60.4    &gt;60  

 

 Egfr   77.7    &gt;60  18









 CBC Auto Diff  2016  White Blood Count  7.2 10^3/uL    3.5-10.8  









 Red Blood Count  5.01 10^6/uL    4.0-5.4  

 

 Hemoglobin  15.8 g/dL    12.0-16.0  

 

 Hematocrit  48 %  High  35-47  

 

 Mean Corpuscular Volume  95 fL    80-97  

 

 Mean Corpuscular Hemoglobin  32 pg  High  27-31  

 

 Mean Corpuscular HGB Conc  33 g/dL    31-36  

 

 Red Cell Distribution Width  14 %    10.5-15  

 

 Platelet Count  127 10^3/uL  Low  150-450  

 

 Mean Platelet Volume  11 um3  High  7.4-10.4  

 

 Abs Neutrophils  5.3 10^3/uL    1.5-7.7  

 

 Abs Lymphocytes  1.4 10^3/uL    1.0-4.8  

 

 Abs Monocytes  0.4 10^3/uL    0-0.8  

 

 Abs Eosinophils  0.1 10^3/uL    0-0.6  

 

 Abs Basophils  0 10^3/uL    0-0.2  

 

 Abs Nucleated RBC  0.01 10^3/uL      

 

 Granulocyte %  73.1 %    38-83  

 

 Lymphocyte %  19.5 %  Low  25-47  

 

 Monocyte %  5.6 %    1-9  

 

 Eosinophil %  1.2 %    0-6  

 

 Basophil %  0.6 %    0-2  

 

 Nucleated Red Blood Cells %  0.1      









 Laboratory test finding  2015  Thyroid Stim Hormone  2.37 uIU/mL    0.36-
3.74  









 Free T4  1.11 ng/dL    0.76-1.46  

 

 Vitamin D,25-Hydroxy  22.8 ng/mL  Low  30.0-100.0  19









 Vitamin B12 And Folate  2015  Vitamin B12  867 pg/mL    193-986  









 Folic Acid  24.4 ng/mL  High  3.1-17.5  20









 Laboratory test finding  2015  Magnesium  1.9 mg/dL    1.8-2.4  

 

 LDL Cholesterol Profile  2015  Cholesterol  162 mg/dL    &lt; 200  21









 Triglycerides  180 mg/dL    &lt; 150  22

 

 HDL Cholesterol  63 mg/dL    &gt; 40  23

 

 LDL-Cholesterol  63 mg/dL    &lt; 100  24









 Glycohemoglobin A1c  2015  Glycohemoglobin (A1c)  12.1 %  High  4.2-6.3  
25









 eAG  301 mg/dL      









 Laboratory test finding  2015  C-Reactive  6.10 mg/L    &lt;3.0  



     Protein,Cardiac        

 

 Comprehensive Metabolic  2015  Glucose  351 mg/dL  High    



 Panel            









 BUN  15 mg/dL    7-18  

 

 Creatinine  1.0 mg/dL    0.6-1.3  

 

 Glom Filtration Rate, Estimate  &gt;60 mL/min    &gt;60  

 

 If   &gt;60 mL/min    &gt;60  26

 

 BUN/Creat  15.0 ratio      

 

 Sodium  135 mmol/L  Low  136-145  

 

 Potassium  4.9 mmol/L    3.5-5.1  

 

 Chloride  96 mmol/L  Low    

 

 Carbon Dioxide  30 mmol/L    21-32  

 

 Anion Gap  9 mEq/L    8-16  

 

 Calcium  8.8 mg/dL    8.5-10.1  

 

 Total Protein  7.0 g/dL    6.4-8.2  

 

 Albumin  3.9 g/dL    3.4-5.0  

 

 Globulin  3.1 g/dL    1.9-4.3  

 

 Alb/Glob  1.3 ratio      

 

 Bilirubin,Total  0.9 mg/dL    0.2-1.0  

 

 Sgot/Ast  7 U/L  Low  15-37  27

 

 SGPT/Alt  11 U/L  Low  12-78  28

 

 Alkaline Phosphatase  123 U/L  High    









 CBC W/Automated Diff  2015  White Blood Count  5.4 K/uL    3.1-10.7  









 Red Blood Count  4.76 M/uL    3.90-5.40  

 

 Hemoglobin  15.1 gm/dL    11.6-15.8  

 

 Hematocrit  45.1 %    36.0-46.1  

 

 Mean Cell Volume  94.7 fl    80.9-99.0  

 

 Mean Corpuscular HGB  31.7 pg    25.9-32.7  

 

 Mean Corpuscular HGB Conc  33.5 g/dL    30.8-34.3  

 

 Platelet Count  119 K/uL  Low  155-360  

 

 Red Cell Distri Width SD  48.0 fl  High  3-47  

 

 Red Cell Distri Width %CV  14.3 %    11.7-14.4  

 

 Mean Platelet Volume  12.4 fL    8.9-12.4  

 

 Neut%  67.4 %    40.4-72.8  

 

 Lymph %  24.2 %    17.0-46.1  

 

 Mono %  6.3 %    4.3-13.2  

 

 Eo%  1.7 %    0.0-6.6  

 

 Bas%  0.4 %    0.0-1.1  

 

 Neut#  3.66 K/uL    1.0-7.0  

 

 Lymph #  1.31 K/uL  Low  1.8-7.0  

 

 Mono #  0.34 K/uL    0.3-0.9  

 

 Eos #  0.09 K/uL    0.0-0.5  

 

 Baso #  0.02 K/uL    0.0-0.1  









 Laboratory test finding  2015  Cytology Pap  See Note      29

 

 Laboratory test finding  07/10/2015  Thyroid Stim Hormone  2.18 uIU/mL    0.36-
3.74  









 Magnesium  1.8 mg/dL    1.8-2.4  









 Microalbumin,Random Urine  07/10/2015  Microalbumin,Urine  &lt; 5.0    &lt; 
20.0  



       mg/L      

 

 Laboratory test finding  07/10/2015  Vitamin D,25-Hydroxy  19.8 ng/mL  Low  
30.0-100.0  30

 

 LDL Cholesterol Profile  07/10/2015  Cholesterol  139 mg/dL    &lt; 200  31









 Triglycerides  139 mg/dL    &lt; 150  32

 

 HDL Cholesterol  65 mg/dL    &gt; 40  33

 

 LDL-Cholesterol  46 mg/dL    &lt; 100  34









 Glycohemoglobin A1c  07/10/2015  Glycohemoglobin (A1c)  10.4 %  High  4.2-6.3  
35









 eAG  252 mg/dL      









 Laboratory test finding  07/10/2015  C-Reactive  5.10 mg/L    &lt;3.0  



     Protein,Cardiac        

 

 Comprehensive Metabolic  07/10/2015  Glucose  258 mg/dL  High    



 Panel            









 BUN  19 mg/dL  High  7-18  

 

 Creatinine  1.0 mg/dL    0.6-1.3  

 

 Glom Filtration Rate, Estimate  &gt;60 mL/min    &gt;60  

 

 If   &gt;60 mL/min    &gt;60  36

 

 BUN/Creat  19.0 ratio      

 

 Sodium  136 mmol/L    136-145  

 

 Potassium  4.4 mmol/L    3.5-5.1  

 

 Chloride  95 mmol/L  Low    

 

 Carbon Dioxide  35 mmol/L  High  21-32  

 

 Anion Gap  6 mEq/L  Low  8-16  

 

 Calcium  9.1 mg/dL    8.5-10.1  

 

 Total Protein  7.4 g/dL    6.4-8.2  

 

 Albumin  4.4 g/dL    3.4-5.0  

 

 Globulin  3.0 g/dL    1.9-4.3  

 

 Alb/Glob  1.5 ratio      

 

 Bilirubin,Total  0.7 mg/dL    0.2-1.0  

 

 Sgot/Ast  8 U/L  Low  15-37  37

 

 SGPT/Alt  13 U/L    12-78  

 

 Alkaline Phosphatase  114 U/L      









 CBC W/Automated Diff  07/10/2015  White Blood Count  7.2 K/uL    3.1-10.7  









 Red Blood Count  4.79 M/uL    3.90-5.40  

 

 Hemoglobin  15.4 gm/dL    11.6-15.8  

 

 Hematocrit  46.4 %  High  36.0-46.1  

 

 Mean Cell Volume  96.9 fl    80.9-99.0  

 

 Mean Corpuscular HGB  32.2 pg    25.9-32.7  

 

 Mean Corpuscular HGB Conc  33.2 g/dL    30.8-34.3  

 

 Platelet Count  134 K/uL  Low  155-360  

 

 Red Cell Distri Width SD  49.4 fl  High  3-47  

 

 Red Cell Distri Width %CV  14.0 %    11.7-14.4  

 

 Mean Platelet Volume  12.6 fL  High  8.9-12.4  

 

 Neut%  69.1 %    40.4-72.8  

 

 Lymph %  24.8 %    17.0-46.1  

 

 Mono %  4.9 %    4.3-13.2  

 

 Eo%  0.8 %    0.0-6.6  

 

 Bas%  0.4 %    0.0-1.1  

 

 Neut#  4.95 K/uL    1.0-7.0  

 

 Lymph #  1.78 K/uL  Low  1.8-7.0  

 

 Mono #  0.35 K/uL    0.3-0.9  

 

 Eos #  0.06 K/uL    0.0-0.5  

 

 Baso #  0.03 K/uL    0.0-0.1  









 CBC W/Automated Diff  2015  White Blood Count  7.3 K/uL    3.1-10.7  









 Red Blood Count  5.28 M/uL    3.90-5.40  

 

 Hemoglobin  16.4 gm/dL  High  11.6-15.8  

 

 Hematocrit  48.4 %  High  36.0-46.1  

 

 Mean Cell Volume  91.7 fl    80.9-99.0  

 

 Mean Corpuscular HGB  31.1 pg    25.9-32.7  

 

 Mean Corpuscular HGB Conc  33.9 g/dL    30.8-34.3  

 

 Platelet Count  140 K/uL  Low  155-360  

 

 Red Cell Distri Width SD  45.4 fl    3-47  

 

 Red Cell Distri Width %CV  13.6 %    11.7-14.4  

 

 Mean Platelet Volume  12.6 fL  High  8.9-12.4  

 

 Neut%  72.3 %    40.4-72.8  

 

 Lymph %  20.4 %    17.0-46.1  

 

 Mono %  5.7 %    4.3-13.2  

 

 Eo%  1.2 %    0.0-6.6  

 

 Bas%  0.4 %    0.0-1.1  

 

 Neut#  5.24 K/uL    1.0-7.0  

 

 Lymph #  1.48 K/uL    0.8-3.4  

 

 Mono #  0.41 K/uL    0.3-0.9  

 

 Eos #  0.09 K/uL    0.0-0.5  

 

 Baso #  0.03 K/uL    0.0-0.1  









 Comprehensive Metabolic Panel  2015  Glucose  370 mg/dL  High    









 BUN  15 mg/dL    7-18  

 

 Creatinine  1.0 mg/dL    0.6-1.3  

 

 Glom Filtration Rate, Estimate  &gt;60 mL/min    &gt;60  

 

 If   &gt;60 mL/min    &gt;60  38

 

 BUN/Creat  15.0 ratio      

 

 Sodium  134 mmol/L  Low  136-145  

 

 Potassium  4.4 mmol/L    3.5-5.1  

 

 Chloride  97 mmol/L  Low    

 

 Carbon Dioxide  29 mmol/L    21-32  

 

 Anion Gap  12 mEq/L    8-16  

 

 Calcium  9.0 mg/dL    8.5-10.1  

 

 Total Protein  7.3 g/dL    6.4-8.2  

 

 Albumin  4.1 g/dL    3.4-5.0  

 

 Globulin  3.2 g/dL    1.9-4.3  

 

 Alb/Glob  1.3 ratio      

 

 Bilirubin,Total  0.8 mg/dL    0.2-1.0  

 

 Sgot/Ast  9 U/L  Low  15-37  39

 

 SGPT/Alt  12 U/L    12-78  

 

 Alkaline Phosphatase  134 U/L  High    









 Laboratory test finding  2015  Sedimentation Rate  7 mm/hr    0-30  









 C-Reactive Protein,Cardiac  8.84 mg/L    &lt;3.0  









 LDL Cholesterol Profile  2015  Cholesterol  139 mg/dL    &lt; 200  40









 Triglycerides  146 mg/dL    &lt; 150  41

 

 HDL Cholesterol  56 mg/dL    &gt; 40  42

 

 LDL-Cholesterol  54 mg/dL    &lt; 100  43









 Glycohemoglobin A1c  2015  Glycohemoglobin (A1c)  10.1 %  High  4.2-6.3  
44









 eAG  243 mg/dL      









 Laboratory test finding  2015  TSH Reflex FT4 and/or  1.97 uIU/mL    0.36
-3.74  45



     FT3        

 

 Microalbumin,Random Urine  2015  Microalbumin,Random  See Note      46



     Urine        

 

 CBC W/Automated Diff  10/14/2014  White Blood Count  5.8 K/uL    3.1-10.7  









 Red Blood Count  4.77 M/uL    3.90-5.40  

 

 Hemoglobin  15.2 gm/dL    11.6-15.8  

 

 Hematocrit  44.9 %    36.0-46.1  

 

 Mean Cell Volume  94.1 fl    80.9-99.0  

 

 Mean Corpuscular HGB  31.9 pg    25.9-32.7  

 

 Mean Corpuscular HGB Conc  33.9 g/dL    30.8-34.3  

 

 Platelet Count  131 K/uL  Low  155-360  

 

 Red Cell Distri Width SD  44.5 fl    3-47  

 

 Red Cell Distri Width %CV  13.3 %    11.7-14.4  

 

 Mean Platelet Volume  12.4 fL    8.9-12.4  

 

 Neut%  74.8 %  High  40.4-72.8  

 

 Lymph %  18.4 %    17.0-46.1  

 

 Mono %  5.2 %    4.3-13.2  

 

 Eo%  1.4 %    0.0-6.6  

 

 Bas%  0.2 %    0.0-1.1  

 

 Neut#  4.30 K/uL    1.0-7.0  

 

 Lymph #  1.06 K/uL    0.8-3.4  

 

 Mono #  0.30 K/uL    0.3-0.9  

 

 Eos #  0.08 K/uL    0.0-0.5  

 

 Baso #  0.01 K/uL    0.0-0.1  









 Comprehensive Metabolic Panel  10/14/2014  Glucose  336 mg/dL  High    









 BUN  19 mg/dL  High  7-18  

 

 Creatinine  0.9 mg/dL    0.6-1.3  

 

 Glom Filtration Rate, Estimate  &gt;60 mL/min    &gt;60  

 

 If   &gt;60 mL/min    &gt;60  47

 

 BUN/Creat  21.1 ratio      

 

 Sodium  134 mmol/L  Low  136-145  

 

 Potassium  4.3 mmol/L    3.5-5.1  

 

 Chloride  98 mmol/L      

 

 Carbon Dioxide  31 mmol/L    21-32  

 

 Anion Gap  9 mEq/L    8-16  

 

 Calcium  8.9 mg/dL    8.5-10.1  

 

 Total Protein  7.2 g/dL    6.4-8.2  

 

 Albumin  4.2 g/dL    3.4-5.0  

 

 Globulin  3.0 g/dL    1.9-4.3  

 

 Alb/Glob  1.4 ratio      

 

 Bilirubin,Total  0.8 mg/dL    0.2-1.0  

 

 Sgot/Ast  13 U/L  Low  15-37  

 

 SGPT/Alt  15 U/L    12-78  

 

 Alkaline Phosphatase  126 U/L  High    









 Laboratory test finding  10/14/2014  C-Reactive Protein,Cardiac  13.00 mg/L    
&lt;3.0  48









 Sedimentation Rate  14 mm/hr    0-30  









 LDL Cholesterol Profile  10/14/2014  Cholesterol  145 mg/dL      49









 Triglycerides  146 mg/dL      50

 

 HDL Cholesterol  54 mg/dL      51

 

 LDL-Cholesterol  62 mg/dL      52









 Laboratory test finding  10/14/2014  Magnesium  1.6 mg/dL  Low  1.8-2.4  









 Thyroid Stim Hormone  1.67 uIU/mL    0.36-3.74  

 

 Free T4  1.19 ng/dL    0.76-1.46  









 Microalbumin,Random Urine  10/14/2014  Microalbumin,Urine  12.4 mg/L    &lt; 
20.0  

 

 Vitamin B12 And Folate  10/14/2014  Vitamin B12  502 pg/mL    200-900  









 Folic Acid  10.8 ng/mL    3.1-17.5  









 Laboratory test finding  10/14/2014  Uric Acid  3.5 mg/dL    2.6-6.0  

 

 Glycohemoglobin A1c  10/14/2014  Glycohemoglobin (A1c)  10.2 %  High  4.2-6.3  
53









 eAG  246 mg/dL      









 1  Comment: lisa  [id number]

 

 2  SEE RESULT BELOW



   -----------------------------------------------------------------------------
---------------



   Name:  RIRI WAY                  : 1955    Attend Dr: Barrera Ellis MD



   Acct:  [id number]  Unit: [id number]  AGE: 62            Location:  [facility]



   Re17                        SEX: F             Status:    DEP ER



   -----------------------------------------------------------------------------
---------------



   



   SPEC: [id number]         TENA:   17-    [facility] DR: Barrera Ellis MD



   REQ:  [id number]              RECD:   



   STATUS: RES              OTHR DR: Andre Kumar MD



   _



   SOURCE: FINGER         SPDESC:INDEX RHT



   ORDERED:  Culture   Stain



   COMMENTS: Comment: lisa



   [id number]



   



   -----------------------------------------------------------------------------
---------------



   Procedure                         Result                         Reported   
        Site



   -----------------------------------------------------------------------------
---------------



   Wound/Misc Gram Stain  Preliminary                               17-
      ML



   3+ Neutrophils



   2+ Epithelial Cells



   



   3+ Gram Positive Cocci in Clusters, resembling Staph



   



   Wound/Misc Culture



   PENDING



   



   -----------------------------------------------------------------------------
---------------



   * ML - MAIN LAB ([facility])



   .



   



   



   



   



   



   



   



   



   



   



   



   



   



   



   



   



   



   



   



   ** END OF REPORT **



   



   * ML=Testing performed at Main Lab



   DEPARTMENT OF PATHOLOGY,  [address] [place] [address]



   Phone # [phone]      Fax #[phone]



   Fernando Baker M.D. Director     [facility] # [id number]

 

 3  SEE RESULT BELOW



   -----------------------------------------------------------------------------
---------------



   Name:  RIRI WAY                  : 1955    Attend Dr: Barrera Ellis MD



   Acct:  [id number]  Unit: [id number]  AGE: 62            Location:  [facility]



   Re17                        SEX: F             Status:    DEP ER



   -----------------------------------------------------------------------------
---------------



   



   SPEC: [id number]         TENA:   17-9    [facility] DR: Barrera Ellis MD



   REQ:  [id number]              RECD:   



   STATUS: MARY GARCIA DR: Andre Kumar MD



   _



   SOURCE: FINGER         SPDESC:INDEX RHT



   ORDERED:  MRSA/SA SSTI, Culture   Stain



   COMMENTS: Verbal to [id number] by [id number] at 2147 on 17.



   Results read back accurately.



   



   Comment: paronychia



   [id number]



   



   -----------------------------------------------------------------------------
---------------



   Procedure                         Result                         Reported   
        Site



   -----------------------------------------------------------------------------
---------------



   MRSA/S. aureus SSTI PCR  Final                                   17-
      ML



   Organism 1                     MRSA NEGATIVE



   Organism 2                     S.AUREUS POSITIVE



   



   Wound/Misc Gram Stain  Final                                     17-
0743      ML



   3+ Neutrophils



   2+ Epithelial Cells



   



   2+ Gram Positive Cocci in Clusters, resembling Staph



   



   Wound/Misc Culture  Final                                        17-
45      ML



   



   Organism 1                     STAPHYLOCOCCUS AUREUS



   Quantity                    3+



   



   1. STAPHYLOCOCCUS AUREUS



   M.I.C.      RX



   --------- ------



   Penicillin                     0.06        S



   Clindamycin                    &lt;=0.25      S



   Erythromycin                   &lt;=0.25      S



   Gentamicin                     &lt;=0.5       S



   Linezolid                      2           S



   Nitrofurantoin                 &lt;=16        S



   Oxacillin                      &lt;=0.25      S



   



   ** CONTINUED ON NEXT PAGE **



   



   * ML=Testing performed at Main Lab



   DEPARTMENT OF PATHOLOGY,  [address] [place] [address]



   Phone # [phone]      Fax #[phone]



   Fernando Baker M.D. Director     [facility] # [id number]



   



   



   



   -----------------------------------------------------------------------------
---------------



   Patient: RIRI WAY                   [id number]     (Continued)



   -----------------------------------------------------------------------------
---------------



   



   



   Specimen: [id number]    Collected:   Received: 
    (Continued)



   



   -----------------------------------------------------------------------------
---------------



   Procedure                         Result                         Reported   
        Site



   -----------------------------------------------------------------------------
---------------



   Wound/Misc Culture  Final   (continued)                          845



   1. STAPHYLOCOCCUS AUREUS  (continued)



   M.I.C.      RX



   --------- ------



   * Quinupristin/Dalfopristin      0.5         S



   Rifampin                       &lt;=0.5       S



   Tetracycline                   &lt;=1         S



   Doxycycline - Deduced                      S



   * Minocycline - Deduced                      S



   Trimethoprim/Sulfamethoxazole  &lt;=10        S



   Vancomycin                     1           S



   Imipenem-Deduced                           S



   * Ampicillin/Sulbactam-Deduced               S



   Cefazolin-Deduced                          S



   



   * These antibiotics are not available in the [facility] Formulary



   



   Contact the Microbiology Department for any additional



   antibiotic reporting.



   



   -----------------------------------------------------------------------------
---------------



   * ML - MAIN LAB ([facility])



   .



   



   



   



   



   



   



   



   



   



   



   



   



   



   



   



   ** END OF REPORT **



   



   * ML=Testing performed at Main Lab



   DEPARTMENT OF PATHOLOGY,  [address] [place] [address]



   Phone # [phone]      Fax #[phone]



   Fernando Baker M.D. Director     [facility] # [id number]

 

 4  *******Because ethnic data is not always readily available,



   this report includes an eGFR for both -Americans and



   non- Americans.****



   The National Kidney Disease Education Program (NKDEP) does



   not endorse the use of the MDRD equation for patients that



   are not between the ages of 18 and 70, are pregnant, have



   extremes of body size, muscle mass, or nutritional status,



   or are non- or non-.



   According to the National Kidney Foundation, irrespective of



   diagnosis, the stage of the disease is based on the level of



   kidney function:



   Stage Description                      GFR(mL/min/1.73 m(2))



   1     Kidney damage with normal or decreased GFR       90



   2     Kidney damage with mild decrease in GFR          60-89



   3     Moderate decrease in GFR                         30-59



   4     Severe decrease in GFR                           15-29



   5     Kidney failure                       &lt;15 (or dialysis)

 

 5  Desirable &lt;150



   Borderline high 150-199



   High 200-499



   Very High &gt;500

 

 6  Desirable &lt;200



   Borderline high 200-239



   High &gt;239

 

 7  Low &lt;40



   Desirable: 40-60



   High: &gt;60

 

 8  Desirable: &lt;100 mg/dL



   Near Optimal: 100-129 mg/dL



   Borderline High: 130-159 mg/dL



   High: 160-189 mg/dL



   Very High: &gt;189 mg/dL

 

 9  Therapeutic target for the treatment of diabetes



   [place] patients is &lt;7% HBA1C, and in selective



   patients &lt;6.0%.Please refer to American Diabetes



   Association Diabetic care guidelines for further



   information.

 

 10  Unable to calculate due to low microalbumin

 

 11  [id number]

 

 12  Therapeutic target for the treatment of diabetes



   [place] patients is &lt;7% HBA1C, and in selective



   patients &lt;6.0%.Please refer to American Diabetes



   Association Diabetic care guidelines for further



   information.

 

 13  Desirable &lt;150



   Borderline high 150-199



   High 200-499



   Very High &gt;500

 

 14  Desirable &lt;200



   Borderline high 200-239



   High &gt;239

 

 15  Low &lt;40



   Desirable: 40-60



   High: &gt;60

 

 16  Desirable: &lt;100 mg/dL



   Near Optimal: 100-129 mg/dL



   Borderline High: 130-159 mg/dL



   High: 160-189 mg/dL



   Very High: &gt;189 mg/dL

 

 17  Low risk: &lt;1.00



   Average risk: 1.00-3.00



   High risk: &gt;3.00

 

 18  *******Because ethnic data is not always readily available,



   this report includes an eGFR for both -Americans and



   non- Americans.****



   The National Kidney Disease Education Program (NKDEP) does



   not endorse the use of the MDRD equation for patients that



   are not between the ages of 18 and 70, are pregnant, have



   extremes of body size, muscle mass, or nutritional status,



   or are non- or non-.



   According to the National Kidney Foundation, irrespective of



   diagnosis, the stage of the disease is based on the level of



   kidney function:



   Stage Description                      GFR(mL/min/1.73 m(2))



   1     Kidney damage with normal or decreased GFR       90



   2     Kidney damage with mild decrease in GFR          60-89



   3     Moderate decrease in GFR                         30-59



   4     Severe decrease in GFR                           15-29



   5     Kidney failure                       &lt;15 (or dialysis)

 

 19  Vitamin D deficiency has been defined by the [place] of



   Medicine and an Endocrine Society practice guideline as a



   level of serum 25-OH vitamin D less than 20 ng/mL (1,2).



   The Endocrine Society went on to further define vitamin D



   insufficiency as a level between 21 and 29 ng/mL (2).



   1. IOM ([place] of [employer]). 2010. Dietary reference



   intakes for calcium and D. [place]: The



   National Academies Press.



   2. Alex MANCILLA, Chavo GILES, Feliciano THOMAS, et al.



   Evaluation, treatment, and prevention of vitamin D



   deficiency: an Endocrine Society clinical practice



   guideline. JCEM. 2011; 96(7):1911-30.



   Performed at:  RN - [facility] [address]  [id number]



   : Araceli B Reyes MD, Phone:  [phone]

 

 20  Result confirmed by repeat analysis.

 

 21  Reference Guidelines*:



   Desirable: ........... &lt; 200 mg/dL



   Borderline High: ..... 200-239 mg/dL



   High: ................ &gt;=240 mg/dL



   * The National Cholesterol Education Program (NCEP)

 

 22  Reference Guidelines*:



   Normal: ............. &lt; 150 mg/dL



   Borderline High: .... 150-199 mg/dL



   High: ............... 200-499 mg/dL



   Very High: .......... &gt; 500 mg/dL



   * Source: National Cholesterol Education Program (NCEP)

 

 23  Reference Guidelines*:



   Low HDL: ..... &lt; 40 mg/dL



   Normal:  ..... 40-60 mg/dL



   Desirable: ... &gt; 60 mg/dL



   *The National Cholesterol Education Program(NCEP)

 

 24  Reference Guidelines*:



   Optimal:........... &lt;100 mg/dL



   Near Optimal....... 100-129 mg/dL



   Borderline High.... 130-159 mg/dL



   High............... 160-189 mg/dL



   Very High.......... &gt;=190 mg/dL



   * Source: National Cholesterol Education Program (NCEP)

 

 25  Elevated levels of HbA1c suggest the need for more



   aggressive treatment of glycemia.



   The American Diabetes Association recommends that a primary



   goal of therapy should be a HbA1c of &lt;7% and that physicians



   should re-evaluate the treatment regimen in patients with



   HbA1c values consistently &gt;8%.

 

 26  Note:



   Persistent reduction for 3 months or more in an eGFR &lt;60



   mL/min/1.73 m2 defines CKD.  Patients with eGFR values &gt;/=60



   mL/min/1.73 m2 may also have CKD if evidence of persistent



   proteinuria is present.



   The original MDRD equation for estimated GFR is not valid



   for patients less than 18 years of age.



   Additional information may be found at www.kdoqi.org.

 

 27  Values below the stated reference ranges of AST and ALT can



   be seen in normal populations. Clinical correlation is



   suggested.

 

 28  Values below the stated reference ranges of AST and ALT can



   be seen in normal populations. Clinical correlation is



   suggested.

 

 29  Interpretation:



   NEGATIVE FOR INTRAEPITHELIAL LESION OR MALIGNANCY.



   



   Specimen Adequacy:



   SATISFACTORY FOR EVALUATION.



   



   



   



   



   



   



   



   



   [facility]          [address] [place] [address], [address]



   Phone [phone]          [place] [address]



   Fax      [phone]



   CYTOLOGY REPORT



   



   



   Name: RIRI WAY Accession #: [id number] : 1955 (Age: 60) Sex: F



   Location: [facility] Rec. # [id number]   Date Collected:



   2015 Billing #: [id number]   Date Received: 9/3/2015     Requisition #



   [id number]       Physician(s):  ANÍBAL BARR



   Source of Specimen:  VAGINAL THIN PREP



   Clinical Information:



   Date of Last Menstrual Period:   



   Treatment History:     Hysterectomy



   



   



   



   azar ***Electronic Signature***



   RYNE Parker (ASCP) Reported: 9/10/2015 Also seen by :RYNE Ann (ASCP)



   [facility]



   



   



   ICD-9 Code(s) V72.31

 

 30  Vitamin D deficiency has been defined by the [place] of



   Medicine and an Endocrine Society practice guideline as a



   level of serum 25-OH vitamin D less than 20 ng/mL (1,2).



   The Endocrine Society went on to further define vitamin D



   insufficiency as a level between 21 and 29 ng/mL (2).



   1. IOM ([place] of [employer]). 2010. Dietary reference



   intakes for calcium and D. [place]: The



   National Academies Press.



   2. Chavo Castaneda [place], Feliciano THOMAS, et al.



   Evaluation, treatment, and prevention of vitamin D



   deficiency: an Endocrine Society clinical practice



   guideline. JCEM. 2011 Jul; 96(7):1911-30.



   Performed at:  RN - [facility] [address]  [id number]



   : Araceli B Reyes MD, Phone:  [phone]

 

 31  Reference Guidelines*:



   Desirable: ........... &lt; 200 mg/dL



   Borderline High: ..... 200-239 mg/dL



   High: ................ &gt;=240 mg/dL



   * The National Cholesterol Education Program (NCEP)

 

 32  Reference Guidelines*:



   Normal: ............. &lt; 150 mg/dL



   Borderline High: .... 150-199 mg/dL



   High: ............... 200-499 mg/dL



   Very High: .......... &gt; 500 mg/dL



   * Source: National Cholesterol Education Program (NCEP)

 

 33  Reference Guidelines*:



   Low HDL: ..... &lt; 40 mg/dL



   Normal:  ..... 40-60 mg/dL



   Desirable: ... &gt; 60 mg/dL



   *The National Cholesterol Education Program(NCEP)

 

 34  Reference Guidelines*:



   Optimal:........... &lt;100 mg/dL



   Near Optimal....... 100-129 mg/dL



   Borderline High.... 130-159 mg/dL



   High............... 160-189 mg/dL



   Very High.......... &gt;=190 mg/dL



   * Source: National Cholesterol Education Program (NCEP)

 

 35  Elevated levels of HbA1c suggest the need for more



   aggressive treatment of glycemia.



   The American Diabetes Association recommends that a primary



   goal of therapy should be a HbA1c of &lt;7% and that physicians



   should re-evaluate the treatment regimen in patients with



   HbA1c values consistently &gt;8%.

 

 36  Note:



   Persistent reduction for 3 months or more in an eGFR &lt;60



   mL/min/1.73 m2 defines CKD.  Patients with eGFR values &gt;/=60



   mL/min/1.73 m2 may also have CKD if evidence of persistent



   proteinuria is present.



   The original MDRD equation for estimated GFR is not valid



   for patients less than 18 years of age.



   Additional information may be found at www.kdoqi.org.

 

 37  Values below the stated reference ranges of AST and ALT can



   be seen in normal populations. Clinical correlation is



   suggested.

 

 38  Note:



   Persistent reduction for 3 months or more in an eGFR &lt;60



   mL/min/1.73 m2 defines CKD.  Patients with eGFR values &gt;/=60



   mL/min/1.73 m2 may also have CKD if evidence of persistent



   proteinuria is present.



   The original MDRD equation for estimated GFR is not valid



   for patients less than 18 years of age.



   Additional information may be found at www.kdoqi.org.

 

 39  Values below the stated reference ranges of AST and ALT can



   be seen in normal populations. Clinical correlation is



   suggested.

 

 40  Reference Guidelines*:



   Desirable: ........... &lt; 200 mg/dL



   Borderline High: ..... 200-239 mg/dL



   High: ................ &gt;=240 mg/dL



   * The National Cholesterol Education Program (NCEP)

 

 41  Reference Guidelines*:



   Normal: ............. &lt; 150 mg/dL



   Borderline High: .... 150-199 mg/dL



   High: ............... 200-499 mg/dL



   Very High: .......... &gt; 500 mg/dL



   * Source: National Cholesterol Education Program (NCEP)

 

 42  Reference Guidelines*:



   Low HDL: ..... &lt; 40 mg/dL



   Normal:  ..... 40-60 mg/dL



   Desirable: ... &gt; 60 mg/dL



   *The National Cholesterol Education Program(NCEP)

 

 43  Reference Guidelines*:



   Optimal:........... &lt;100 mg/dL



   Near Optimal....... 100-129 mg/dL



   Borderline High.... 130-159 mg/dL



   High............... 160-189 mg/dL



   Very High.......... &gt;=190 mg/dL



   * Source: National Cholesterol Education Program (NCEP)

 

 44  Elevated levels of HbA1c suggest the need for more



   aggressive treatment of glycemia.



   The American Diabetes Association recommends that a primary



   goal of therapy should be a HbA1c of &lt;7% and that physicians



   should re-evaluate the treatment regimen in patients with



   HbA1c values consistently &gt;8%.

 

 45  QUERY: Reflex add FT3?  N



   QUERY: Reflex add FT4?  Y

 

 46  NO URINE SENT

 

 47  Note:



   Persistent reduction for 3 months or more in an eGFR &lt;60



   mL/min/1.73 m2 defines CKD.  Patients with eGFR values &gt;/=60



   mL/min/1.73 m2 may also have CKD if evidence of persistent



   proteinuria is present.



   The original MDRD equation for estimated GFR is not valid



   for patients less than 18 years of age.



   Additional information may be found at www.kdoqi.org.

 

 48  Result confirmed by repeat analysis.

 

 49  Reference Guidelines*:



   Desirable: ........... &lt; 200 mg/dL



   Borderline High: ..... 200-239 mg/dL



   High: ................ &gt;=240 mg/dL



   * The National Cholesterol Education Program (NCEP)

 

 50  Reference Guidelines*:



   Normal: ............. &lt; 150 mg/dL



   Borderline High: .... 150-199 mg/dL



   High: ............... 200-499 mg/dL



   Very High: .......... &gt; 500 mg/dL



   * Source: National Cholesterol Education Program (NCEP)

 

 51  Reference Guidelines*:



   Low HDL: ..... &lt; 40 mg/dL



   Normal:  ..... 40-60 mg/dL



   Desirable: ... &gt; 60 mg/dL



   *The National Cholesterol Education Program(NCEP)

 

 52  Reference Guidelines*:



   Optimal:........... &lt;100 mg/dL



   Near Optimal....... 100-129 mg/dL



   Borderline High.... 130-159 mg/dL



   High............... 160-189 mg/dL



   Very High.......... &gt;=190 mg/dL



   * Source: National Cholesterol Education Program (NCEP)

 

 53  Elevated levels of HbA1c suggest the need for more



   aggressive treatment of glycemia.



   The American Diabetes Association recommends that a primary



   goal of therapy should be a HbA1c of &lt;7% and that physicians



   should re-evaluate the treatment regimen in patients with



   HbA1c values consistently &gt;8%.







Procedures







 Date  CPT Code  Description  Status

 

 2017  88189  Visual Screening Test  Completed

 

 2017  88660  EKG  Completed

 

 2017  58583  Audiometry, Bekesy, Screening  Completed

 

 2016  13212  EKG  Completed

 

 2016  31260  Visual Screening Test  Completed

 

 2016  13668  EKG  Completed

 

 2016  41181  Audiometry, Bekesy, Screening  Completed

 

 2015  33806  Spirometry  Completed

 

 2015  78581  EKG  Completed

 

 2015  66932  Tympanometry  Completed

 

 10/13/2014  27015  Visual Screening Test  Completed

 

 10/13/2014  91932  EKG  Completed

 

 10/13/2014  79927  Audiometry, Bekesy, Screening  Completed







Encounters







 Type  Date  Location  Provider  CPT E/M  Dx

 

 Office Visit  2017  2:00p  [facility]  [facility]  [address]  I10









 Z95.0

 

 I25.10

 

 E78.2

 

 E11.65









 Office Visit  10/09/2017  4:30p  [facility]  [facility]  [address]  I10









 Z95.0

 

 I25.10

 

 E78.2

 

 Z23

 

 M25.562









 Office Visit  2017 11:15a  [facility]  [facility]  [address]  I10









 Z95.0

 

 I25.10

 

 E78.2

 

 E11.65

 

 K21.0

 

 J30.1

 

 Z00.01









 Office Visit  2017  2:00p  [facility]  [facility]  [address]  
M25.562









 M54.5









 Office Visit  2017  2:00p  [facility]  [facility]  [address]  
M25.562









 I10

 

 Z95.0

 

 K21.0

 

 M54.5

 

 E11.65









 Office Visit  10/19/2016  1:45p  [facility]  [facility]  [address]  J30.2









 I10

 

 Z95.0

 

 K21.0

 

 M54.5

 

 E11.65

 

 E78.2

 

 Z23









 Office Visit  2016  4:30p  [facility]  [facility]  [address]  Z11.1

 

 Office Visit  2016  2:15p  [facility]  Andre Kumar M.D.  [address]  
R07.9









 R31.9

 

 L20.9

 

 J45.909

 

 J30.2

 

 I10

 

 Z95.0

 

 I49.5

 

 J44.9

 

 K30

 

 K21.0

 

 M54.5

 

 M15.9

 

 E11.65

 

 E78.2

 

 I25.2

 

 I25.10

 

 R07.89

 

 M25.512

 

 L40.9

 

 H90.3

 

 G56.02









 Office Visit  2016  9:00a  [facility]  Andre Kumar M.D.  [address]  
Z00.01









 R31.9

 

 L20.9

 

 J45.909

 

 Z68.36

 

 J30.2

 

 I10

 

 Z95.0

 

 I49.5

 

 J44.9

 

 K30

 

 K21.0

 

 M54.5

 

 M15.9

 

 E11.65

 

 E78.2

 

 I25.2

 

 I25.10

 

 R07.89

 

 M25.512

 

 L40.9

 

 H90.3









 Office Visit  2015  9:15a  [facility]  Andre Kumar M.D.  [address]  
R31.9









 L20.9

 

 J45.909

 

 J30.2

 

 I10

 

 Z95.0

 

 I49.5

 

 J44.9

 

 K30

 

 K21.0

 

 M54.5

 

 M15.9

 

 E11.65

 

 E78.2

 

 I25.2

 

 I25.10

 

 R07.89

 

 M25.512

 

 L40.9









 Office Visit  2015  9:15a  [facility]  Andre Kumar M.D.  [address]  
R31.9









 L20.9

 

 J45.909

 

 J30.2

 

 I10

 

 Z95.0

 

 I49.5

 

 J44.9

 

 K30

 

 K21.0

 

 M54.5

 

 M15.9

 

 E11.65

 

 E78.2

 

 I25.2

 

 I25.10

 

 R07.89

 

 M25.512

 

 L40.9

 

 Z12.39









 Office Visit  10/15/2015 10:00a  [facility]  Andre Kumar M.D.  [address]  
Z23









 L20.9

 

 J45.909

 

 J30.2

 

 I10

 

 Z95.0

 

 I49.5

 

 J44.9

 

 K30

 

 K21.0

 

 M54.5

 

 M15.9

 

 E11.65

 

 E78.2

 

 I25.2

 

 I25.10

 

 R07.89

 

 M25.512

 

 L40.9









 Office Visit  2015 10:45a  [facility]  Andre Kumar M.D.  [address]  
691.8









 696.8

 

 493.90

 

 477.8

 

 401.1

 

 V45.01

 

 427.81

 

 496

 

 536.8

 

 530.11

 

 724.2

 

 715.90

 

 250.02

 

 272.2

 

 412

 

 414.01

 

 786.59

 

 719.41









 Office Visit  2015  2:15p  [facility]  [facility]  [address]  V72.31









 V76.10

 

 V76.41

 

 V76.2









 Office Visit  07/10/2015  9:45a  [facility]  Andre Kmuar M.D.  [address]  
691.8









 696.8

 

 493.90

 

 477.8

 

 401.1

 

 V45.01

 

 427.81

 

 496

 

 536.8

 

 530.11

 

 724.2

 

 715.90

 

 250.02

 

 272.2

 

 412

 

 414.01

 

 786.59

 

 719.41

 

 V76.12









 Office Visit  2015  4:15p  [facility]  Andre Kumar M.D.  [address]  
691.8









 696.8

 

 493.90

 

 477.8

 

 401.1

 

 V45.01

 

 427.81

 

 496

 

 536.8

 

 530.11

 

 724.2

 

 715.90

 

 250.02

 

 272.2

 

 412

 

 414.01

 

 786.59

 

 719.41









 Office Visit  2015  4:15p  [facility]  Andre Kumar M.D.  [address]  
466.0









 461.8

 

 382.9

 

 478.19

 

 786.2

 

 786.05

 

 691.8

 

 696.8

 

 493.90

 

 477.8

 

 401.1

 

 V45.01

 

 427.81

 

 496

 

 536.8

 

 530.11

 

 724.2

 

 715.90

 

 250.02

 

 272.2

 

 412

 

 414.01

 

 786.59









 Office Visit  2015  9:00a  [facility]  Andre Kumar M.D.  [address]  
466.0









 461.8

 

 382.9

 

 478.19

 

 786.2

 

 786.05

 

 691.8

 

 696.8

 

 493.90

 

 477.8

 

 401.1

 

 V45.01

 

 427.81

 

 496

 

 536.8

 

 530.11

 

 724.2

 

 715.90

 

 250.02

 

 272.2

 

 412

 

 414.01

 

 786.59









 Office Visit  2014  4:00p  [facility]  Andre Kumar M.D.  [address]  
786.59









 414.01

 

 412

 

 272.2

 

 250.02

 

 715.90

 

 724.2

 

 530.11

 

 536.8

 

 496

 

 427.81

 

 V45.01

 

 401.1

 

 477.8

 

 493.90

 

 696.8

 

 691.8









 Office Visit  10/21/2014  9:45a  [facility]  Andre Kumar M.D.  [address]  
786.59









 414.01

 

 412

 

 272.2

 

 250.02

 

 715.90

 

 724.2

 

 530.11

 

 536.8

 

 496

 

 427.81

 

 V45.01

 

 401.1

 

 477.8

 

 493.90

 

 696.8

 

 691.8









 Office Visit  10/14/2014 10:30a  [facility]  Andre Kumar M.D.  [address]  
786.59









 414.01

 

 412

 

 272.2

 

 250.02

 

 715.90

 

 724.2

 

 530.11

 

 536.8

 

 496

 

 427.81

 

 V45.01

 

 401.1

 

 477.8

 

 493.90

 

 696.8

 

 691.8









 Office Visit  10/13/2014 10:45a  [facility]  Andre Kumar M.D.  [address]  
786.59









 414.01

 

 412

 

 272.2

 

 250.02

 

 715.90

 

 724.2

 

 V70.1

 

 530.11

 

 536.8

 

 496

 

 V85.35

 

 427.81

 

 V45.01

 

 401.1

 

 477.8

 

 493.90

 

 696.8

 

 691.8

 

 367.1

 

 V70.0

 

 V04.81







Plan of Care

Future Appointment(s):2017  2:00 pm - Andre Kumar M.D. at [place] 
[id number]  2:00 pm - [facility] [id number] - Andre Kumar M.D.I25.10 Athscl heart disease of native coronary artery w/o ang 
pctrsComments:F/U WITH CARDIOLOGY CONTINUE WITH RX AND F/U LABE78.2 Mixed 
hyperlipidemiaComments:DIET REVIEWED CONTINUE DIETWT LOSSF/U LAB FBWI10 
Essential (primary) hypertensionComments:CHECK BP TIW ( PRN)F/U LABDIET AND 
FLUID COUNSELING LOW SODIUM DIETWT LOSSF/U LABE11.65 Type 2 diabetes mellitus 
with hyperglycemiaComments:DIET REVIEWED CONTINUE DIETWT LOSSF/U LAB FS qAC AND 
HS PRN F/U FBWZ95.0 Presence of cardiac pacemakerComments:F/U WITH 
UXKJPAYRKIO39.0 Gastro-esophageal reflux disease with esophagitisComments:AVOID 
CAFFEINE, ETOH AND SPICY FOODSTUMS OR MYLANTA PRN CALL WITH PROBLEMS OR 
VETKKPWCD14.9 Atopic dermatitis, unspecifiedComments:SKIN CARE INSTRUCTIONS  
LOTION OR BABY OIL 2-3  APPLICATION PER DAYUSE MOISTURIZING SOAPAVOID PROLONGED 
WATER EXPOSUREAVOID USING HOT WATER IN YTUVEBJ80.9 Allergic rhinitis, 
unspecifiedComments:INCREASE PO FLUID USE ANTIHISTAMINE PRN SECOND HAND SMOKING 
AVOIDANCEM54.5 Low back painComments:EXERCISE/HEAT /MESSAGEAVOID HEAVY LIFTING 
WT LOSSTYLENOL OR MOTRIN PRNM25.569 Pain in unspecified kneeJ45.909 Unspecified 
asthma, uncomplicatedComments:MDI / NEBULIZER TX PRN AVOID EXPOSURE TO SMOKING 
OR LZXZVL12.5 Sick sinus syndromeComments:STABLE F/U WITH CPNWYKPVWEJ35.9 
Chronic obstructive pulmonary disease, unspecifiedComments:INCREASE PO 
FLUIDREST NEB OR MDI AND /OR XXDPNTE40 Functional dyspepsiaComments:AVOID 
CAFFEINE, ETOH AND SPICY FOODSTUMS OR MYLANTA PRN CALL WITH PROBLEMS OR 
NYUWOGDRA93.9 Polyosteoarthritis, unspecifiedComments:EXERCISE/HEAT/
MESSAGETYLENOL OR MOTRIN PRNAVOID HEAVY LIFTINGWT LOSSI25.2 Old myocardial 
infarctionComments:F/U WITH CARDIOLOGY CONTINUE WITH RX AND F/U LABM25.512 Pain 
in left shoulderComments:EXERCISE/HEAT /MESSAGE AVOID HEAVY LIFTINGTYLENOL OR 
MOTRIN PRNL40.9 Psoriasis, unspecifiedComments:SKIN CARE INSTRUCTIONS  LOTION 
OR BABY OIL 2-3  APPLICATION PER DAYUSE MOISTURIZING SOAPAVOID PROLONGED WATER 
EXPOSUREAVOID USING HOT WATER IN KSNFOPB09.3 Sensorineural hearing loss, 
bilateralComments:USE HEARING AID OBSERVE F/U WITH ENT PRNG56.02 Carpal tunnel 
syndrome, left upper limbComments:WRIST SPLINTEXERCISE/HEAT/MESSAGE F/U WITH 
ORTHO PRNL03.011 Cellulitis of right fingerNew Medication:Levaquin 750 
mgComments:CONTINUE WITH ABXM79.644 Pain in right finger(s)New Xrays:Hand, Min. 
3 Views, RTComments:TYLENOL OR MOTRIN PRN EXERCISE/HEAT/IVMELYFO41.0 Localized 
edemaComments:ELEVATE

## 2017-12-04 NOTE — PN
Progress Note





- Progress Note


Date of Service: 12/04/17


Note: 


Patient PCR positive for s aureus neg for MRSA. patient treated with augmentin 

and clindamycin. will wait for final sensitive.

## 2017-12-06 NOTE — ED
Progress





- Progress Note


Progress Note: 





Pt's wound cx reveals Staph aureus. Organism is sens to clindamycin which pt 

has been taking,however it is resistant to PCN. Pt was advised to take 

augmentin in addition to clindamycin. [facility] in an effort to notify pt she may 

stop taking the augmentin and complete clindamycin.





Course/Dx





- Course


Course Of Treatment: 62F presents with right index finger swelling and redness 

for week and a half a go.  She had a paronchyia which drained with needle a 

week ago and placed on keflex and bactrim for a week.  She states swelling 

increased over the extensor surface of her right index finger. She then saw her 

primary and was placed on levaquin and keflex and the swelling has continued to 

increase over the weekend.  She has been having pus drainage from area. on exam 

has erythema and edema from nailbed to PIP, has popped blister like area near 

nailbed with pus like drainage from area. patient able to extend fingers, put 

unable to flex fingers, pain with flexion, no palmar involvement. afebrile. wbc 

normal. lactic 2.9. gave dose of levaquin here. gave fluids. attempted I&D no 

drainage per dr capps suggestion. discussed with dr capps said los de la cruz. dr de la cruz says that need to switch antibiotic to amoxicillin and clindamycin and 

follow up with ortho and suggested warm soaks 4 times a day. warned of signs to 

return to ED for. vishal palacio and agrees with plan.





- Diagnoses


Provider Diagnoses: 


 Abscess of right index finger








- Provider Notifications


Time Discussed With Above Provider: 15:46 - discharge on augmentin and 

clindamycin, follow up in 2 days, warm soaks 4 times a day

## 2017-12-08 ENCOUNTER — HOSPITAL ENCOUNTER (OUTPATIENT)
Dept: HOSPITAL 25 - OREAST | Age: 62
Discharge: HOME | End: 2017-12-08
Attending: ORTHOPAEDIC SURGERY
Payer: COMMERCIAL

## 2017-12-08 VITALS — SYSTOLIC BLOOD PRESSURE: 120 MMHG | DIASTOLIC BLOOD PRESSURE: 64 MMHG

## 2017-12-08 DIAGNOSIS — Z87.891: ICD-10-CM

## 2017-12-08 DIAGNOSIS — Z95.5: ICD-10-CM

## 2017-12-08 DIAGNOSIS — Z79.01: ICD-10-CM

## 2017-12-08 DIAGNOSIS — A49.01: ICD-10-CM

## 2017-12-08 DIAGNOSIS — R42: ICD-10-CM

## 2017-12-08 DIAGNOSIS — Z95.810: ICD-10-CM

## 2017-12-08 DIAGNOSIS — J45.909: ICD-10-CM

## 2017-12-08 DIAGNOSIS — E13.42: ICD-10-CM

## 2017-12-08 DIAGNOSIS — L02.511: Primary | ICD-10-CM

## 2017-12-08 DIAGNOSIS — Z79.84: ICD-10-CM

## 2017-12-08 DIAGNOSIS — I25.10: ICD-10-CM

## 2017-12-08 DIAGNOSIS — I25.2: ICD-10-CM

## 2017-12-08 PROCEDURE — 87070 CULTURE OTHR SPECIMN AEROBIC: CPT

## 2017-12-08 PROCEDURE — 87205 SMEAR GRAM STAIN: CPT

## 2017-12-08 PROCEDURE — 87186 SC STD MICRODIL/AGAR DIL: CPT

## 2017-12-08 PROCEDURE — 87073 CULTURE BACTERIA ANAEROBIC: CPT

## 2017-12-08 PROCEDURE — 87077 CULTURE AEROBIC IDENTIFY: CPT

## 2017-12-08 NOTE — OP
DATE OF OPERATION:  12/08/17 Located within [facility]

 

DATE OF BIRTH:  [birth year]

 

SURGEON:  Isa Mensah MD

 

ASSISTANT:  OSCAR Gallego



ANESTHESIOLOGIST:  Dr. Hoffman

 

ANESTHESIA:  Local MAC.

 

PRE-OP DIAGNOSIS:  Right index finger infection.

 

POST-OP DIAGNOSIS:  Right index finger infection.

 

OPERATIVE PROCEDURE:  I and D, right index finger.

 

INDICATIONS:  Riri is a 62-year-old female who developed an infection in the 
dorsal aspect of her right index finger.  She has had some improvement with 
antibiotics and an I and D in the emergency room, but still has significant 
amount of purulence, so she presents for I and D of the right index finger.

 

ESTIMATED BLOOD LOSS:  Zero.

 

TOURNIQUET TIME:  About 15 minutes.

 

DESCRIPTION OF PROCEDURE:  The patient was brought to the operative room and 
was given a sedation anesthetic and a digital block with 10 cc of 1% plain 
lidocaine. The skin of her right hand and forearm was prepped and draped in the 
usual sterile fashion.  The index finger was exsanguinated and a Tourni-Cot 
placed.  The finger was debrided with a rongeur and then copiously irrigated 
with a liter of saline. Cultures were obtained prior to the debridement.  The 
wound was dressed with Xeroform, 4x4, Webril, and Coban.  The patient tolerated 
the procedure well and was brought to the recovery room in good condition.

 

 [id number]/CPS #: [id number]

MTDD

## 2018-01-16 ENCOUNTER — HOSPITAL ENCOUNTER (OUTPATIENT)
Dept: HOSPITAL 25 - OREAST | Age: 63
Discharge: HOME | End: 2018-01-16
Attending: ORTHOPAEDIC SURGERY
Payer: COMMERCIAL

## 2018-01-16 VITALS — SYSTOLIC BLOOD PRESSURE: 115 MMHG | DIASTOLIC BLOOD PRESSURE: 63 MMHG

## 2018-01-16 DIAGNOSIS — E11.69: ICD-10-CM

## 2018-01-16 DIAGNOSIS — Z79.84: ICD-10-CM

## 2018-01-16 DIAGNOSIS — I25.2: ICD-10-CM

## 2018-01-16 DIAGNOSIS — Z87.891: ICD-10-CM

## 2018-01-16 DIAGNOSIS — I42.9: ICD-10-CM

## 2018-01-16 DIAGNOSIS — I25.10: ICD-10-CM

## 2018-01-16 DIAGNOSIS — I10: ICD-10-CM

## 2018-01-16 DIAGNOSIS — J45.909: ICD-10-CM

## 2018-01-16 DIAGNOSIS — B95.61: ICD-10-CM

## 2018-01-16 DIAGNOSIS — L02.511: ICD-10-CM

## 2018-01-16 DIAGNOSIS — M86.8X4: Primary | ICD-10-CM

## 2018-01-16 PROCEDURE — 87205 SMEAR GRAM STAIN: CPT

## 2018-01-16 PROCEDURE — 87070 CULTURE OTHR SPECIMN AEROBIC: CPT

## 2018-01-16 PROCEDURE — 87077 CULTURE AEROBIC IDENTIFY: CPT

## 2018-01-16 PROCEDURE — 87186 SC STD MICRODIL/AGAR DIL: CPT

## 2018-01-16 PROCEDURE — 87073 CULTURE BACTERIA ANAEROBIC: CPT

## 2018-01-17 NOTE — OP
DATE OF OPERATION:  01/16/18 [facility]

 

DATE OF BIRTH:  [birth year]

 

SURGEON:  Isa Mensah MD

 

ASSISTANT:  OSCAR Gallego

 

ANESTHESIA:  Local MAC.

 

PRE-OP DIAGNOSIS:  Right index finger osteomyelitis.

 

POST-OP DIAGNOSIS:  Right index finger osteomyelitis.

 

OPERATIVE PROCEDURE:  I and D right index finger.

 

ESTIMATED BLOOD LOSS:  Zero.

 

TOURNIQUET TIME:  About 20 minutes.

 

INDICATIONS FOR PROCEDURE:  Riri is a 62-year-old female who has had an I and 
D of her right index finger, but has persistent drainage and x-ray evidence of 
osteomyelitis of the distal phalanx.  She presents for I and D of the right 
index finger.

 

DESCRIPTION OF PROCEDURE:  The patient was brought to the operating room, was 
given a sedation anesthetic and a digital block of 10 cc of 1% plain lidocaine.
  The skin of her right hand and forearm was prepped and draped in the usual 
sterile fashion. The hand and forearm were exsanguinated and the tourniquet 
elevated to 250 mmHg.  A small longitudinal incision was made on the dorsal 
aspect of the DIP joint and there was purulent drainage.  This was cultured.  
Portions of the distal phalanx were also removed with rongeur and sent for 
culture as well.  The wound was copiously irrigated with a liter of saline.  
The wound was loosely closed and then dressed with Xeroform, 4x4, Webril, and 
Coban.  The patient tolerated the procedure well, was brought to the recovery 
room in good condition.

 

 [id number]/CPS #: [id number]

MTDSAMIR

## 2018-05-02 NOTE — HP
PREOPERATIVE HISTORY AND PHYSICAL:

 

DATE OF SURGERY[facility]ADMISSION[facility]

 

ATTENDING SURGEON:  Isa Mensah MD * (DICTATED BY OSCAR BENNETT)

 

PROCEDURE:  Right index finger distal interphalangeal joint fusion.

 

CARDIOLOGIST:  Dr. Anaya.

 

CHIEF COMPLAINT:  Instability, right index finger DIP joint.

 

HISTORY OF PRESENT ILLNESS:  This is a 62-year-old female with longstanding 
issues involving her right index DIP joint that started back in 2017 
when she developed paronychia.  She had an I and D performed by Dr. Mensah in 
2017, but unfortunately ended up developing infection and 
osteomyelitis and postinfection arthritis at the DIP joint.  Through the course 
of infection of her finger, she was followed by Dr. Santiago and he handled 
antibiotics.  It was planned that as the osteomyelitis cleared that the patient 
would subsequently undergo a DIP joint fusion.  The patient has various medical 
issues including chronic pain in knees and back, cardiac disease.  She had a 
pacemaker implanted in  and is on Plavix. She had a cardiac catheterization 
in  and she also has diabetes mellitus type

2.  She will remain on Plavix perioperatively. She will get clearance from her 
cardiologist, Dr. Anaya, prior to proceeding with surgery.  She has not had any 
recent chest pain.

 

PAST MEDICAL HISTORY:

1.  Diabetes mellitus, 2.

2.  Hypertension.

3.  Asthma.

4.  History of nephrolithiasis.

5.  History of myocardial infarction in .

6.  Coronary arteriosclerosis.

 

PAST SURGICAL HISTORY:

1.  Pacemaker implantation in .

2.  Cardiac catheterization in .

3.  Stent placement.

4.   x3.

5.  Hysterectomy.

6.  I and D of right index finger in 2017. CURRENT MEDICATIONS:

1.  Advair Diskus 250/50 mcg/dose 1 puff p.o. b.i.d.

2.  Aldactone 25 mg one half daily.

3.  Aspirin 81 mg daily.

4.  Atorvastatin calcium 20 mg daily.

5.  Gabapentin 600 mg twice daily.

6.  Irbesartan 150 mg daily.

7.  Loratadine 10 mg daily.

8.  Metformin  mg b.i.d.

9.  Metoprolol succinate ER 25 mg daily.

10.  Naproxen 250 mg b.i.d.

11.  Nitroglycerin 0.4 mg/HR 1 under tongue, may repeat 3 times p.r.n.

12.  Pantoprazole sodium 40 mg daily.

13.  Plavix 75 mg daily.

14.  ProAir  (90 base) mcg/ACT 2 puffs p.o. q.4 hours p.r.n.

15.  Tramadol HCL 50 mg 1 tab 3 times a day p.r.n.

16.  Zofran 4 mg q.8 hours p.r.n. nausea.

 

ALLERGIES:  ADHESIVES and LATEX and BANDAGES cause skin irritation; LISINOPRIL, 
reaction unknown; VICKS VAPORUB causes difficulty breathing.

 

FAMILY HISTORY:  Diabetes, heart disease, hypertension, stroke, cancer.

 

SOCIAL HISTORY:  The patient is employed as a home health aide.  She is a 
former smoker, she quit in .  Prior to that, she smoked since age 14 a pack 
per day. She denies illicit drug use and does not drink alcohol.

 

REVIEW OF SYSTEMS:  General:  Negative for fevers, chills, or night sweats.  No 
known anesthesia problems.  HEENT:  Negative for headache, lightheadedness, or 
syncopal episodes.  Integumentary:  Negative for abrasions, lesions, or open 
wounds.  Cardiothoracic:  Negative for hypertension, chest pain, palpitations, 
or edema.  Pulmonary:  Positive for shortness of breath with exertion.  
Negative for chronic cough, COPD.  GI:  Negative for occasional nausea.  
Negative for vomiting, diarrhea, constipation, or GERD.  :  Negative for 
nocturia, urinary frequency, urgency, or history of UTIs.  Musculoskeletal:  
Positive for current complaint. Neurological:  Negative for paresthesias, 
numbness, history of seizure, stroke, or epilepsy.  Endocrine:  Positive for 
diabetes.  Negative for thyroid issues. Hematologic:  Negative for easy bruising
, anemia, excessive bleeding, or history of DVT.  Infectious Disease:  Negative 
for history of MRSA, hepatitis C, HIV.

 

                               PHYSICAL EXAMINATION

 

GENERAL:  Well-developed, well-nourished 62-year-old female, in no acute 
distress.

 

VITAL SIGNS:  Height 5 feet 6 inches, weight 198 pounds.  Pulse rate 69, blood 
pressure 126/70.

 

HEENT:  Normocephalic, atraumatic.  Pupils are equal, round, and reactive to 
light and accommodation.  Extraocular motions are intact.  Throat is clear.

 

NECK:  Supple.  No palpable lymph nodes.

 

PULMONARY:  Lungs are clear to auscultation bilaterally.  No wheezes, rales, or 
rhonchi.

 

CARDIOVASCULAR:  Regular rate and rhythm.  S1, S2.  No murmurs, rubs, or 
gallops. No edema.

 

ABDOMEN:  Positive bowel sounds.  Soft, nontender.

 

NEUROLOGICAL:  Alert and oriented x3.  Cranial nerves II through XII are 
intact. Sensation is intact to light touch.

 

PERIPHERAL VASCULAR:  2+ radial and ulnar pulses.  Negative Arnaud test.

 

MUSCULOSKELETAL:  On exam of the right index finger, there is a healed wound 
across the dorsum at the DIP joint.  It is very tender to palpation.  There is 
increased laxity and pain with side-to-side movement.  There is active motion 
at the joint, but difficulty with flexing both at the DIP and PIP joints.  
Neurovascular function is intact.

 

 IMAGING STUDIES:  Most recent x-rays of the right index finger show a 
persistent deformity at the base of the distal phalanx.  There is no sign of 
osteomyelitis at this time.

 

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

 

PLAN:  The patient is scheduled to undergo a right index finger DIP joint 
fusion with Dr. Mensah on 18.  She will return to the office 10 to 14 days 
postoperatively for followup and suture removal.  A prescription for Norco was e
- scribed to the patient's pharmacy for postoperative pain management with 
instructions for her to not use the narcotic until after surgery.  We will 
obtain clearance from her cardiologist, Dr. Anaya, prior to proceeding with 
surgery.

 

 ____________________________________ OSCAR BENNETT

 

[id number]/CPS #: [id number]

TOMMY

## 2018-05-11 ENCOUNTER — HOSPITAL ENCOUNTER (OUTPATIENT)
Dept: HOSPITAL 25 - OREAST | Age: 63
Discharge: HOME | End: 2018-05-11
Attending: ORTHOPAEDIC SURGERY
Payer: MEDICARE

## 2018-05-11 VITALS — SYSTOLIC BLOOD PRESSURE: 111 MMHG | DIASTOLIC BLOOD PRESSURE: 62 MMHG

## 2018-05-11 DIAGNOSIS — I25.2: ICD-10-CM

## 2018-05-11 DIAGNOSIS — Z79.01: ICD-10-CM

## 2018-05-11 DIAGNOSIS — E11.9: ICD-10-CM

## 2018-05-11 DIAGNOSIS — I10: ICD-10-CM

## 2018-05-11 DIAGNOSIS — I25.10: ICD-10-CM

## 2018-05-11 DIAGNOSIS — Z87.891: ICD-10-CM

## 2018-05-11 DIAGNOSIS — M00.841: Primary | ICD-10-CM

## 2018-05-11 DIAGNOSIS — Z79.84: ICD-10-CM

## 2018-05-11 DIAGNOSIS — Z95.0: ICD-10-CM

## 2018-05-11 DIAGNOSIS — Z95.5: ICD-10-CM

## 2018-05-11 DIAGNOSIS — J45.909: ICD-10-CM

## 2018-05-11 PROCEDURE — C1776 JOINT DEVICE (IMPLANTABLE): HCPCS

## 2018-05-11 PROCEDURE — 76000 FLUOROSCOPY <1 HR PHYS/QHP: CPT

## 2018-05-11 PROCEDURE — 88304 TISSUE EXAM BY PATHOLOGIST: CPT

## 2018-05-11 PROCEDURE — C1713 ANCHOR/SCREW BN/BN,TIS/BN: HCPCS

## 2018-05-11 PROCEDURE — 88311 DECALCIFY TISSUE: CPT

## 2018-05-11 NOTE — RAD
INDICATION: Right index finger fusion     



COMPARISON: None



FINDINGS: 45 seconds of fluoroscopy were provided for the orthopedic department.

Fluoroscopic spot imaging of the right second digit were obtained for operative control .



CPT II Codes:  (fluoro time doc)

## 2018-05-12 NOTE — OP
DATE OF OPERATION:  05/11/18 - [facility]

 

DATE OF BIRTH:  [birth year]

 

SURGEON:  Isa Mensah MD

 

ASSISTANT:  OSCAR Gallego

 

ANESTHESIA:  Local MAC.

 

PRE-OP DIAGNOSIS:  Status post osteomyelitis of the right index finger distal 
phalanx with postinfectious arthritis of the DIP joint.

 

POST-OP DIAGNOSIS:  Status post osteomyelitis of the right index finger distal 
phalanx with postinfectious arthritis of the DIP joint.

 

OPERATIVE PROCEDURE:  Right index finger distal interphalangeal joint fusion.

 

ESTIMATED BLOOD LOSS:  Zero.

 

TOURNIQUET TIME:  About half an hour.

 

INDICATION FOR PROCEDURE:  Riri is a 62-year-old female who developed an 
osteomyelitis of her right index finger distal phalanx after a wound on the 
dorsal aspect of her finger.  She has had extensive treatment with IV 
antibiotics, presents now for DIP fusion as her DIP joint was destroyed by the 
infection in the presence of the open wound for so long.

 

DESCRIPTION OF PROCEDURE:  The patient was brought to the operating room, was 
given a sedation of anesthetic and a digital block with 10 cc of 1% plain 
lidocaine.  The skin of her right hand and forearm was prepped and draped in 
the usual sterile fashion.  The index finger was exsanguinated with a Tourni-
Cot.  An H-shaped incision was made over the DIP joint dorsal aspect.  There 
was a tiny draining sinus tract and this was excised.  The wound was copiously 
irrigated with saline. The joint was hyperflexed to expose both the ends of the 
phalanges and the DIP joint.  The remaining cartilage and subchondral bone was 
removed.  A guidewire from the micro Acutrak set was driven through the center 
of the middle phalanx and then removed and then entered through the center of 
the distal phalanx out the end of the finger and then back into the center of 
the middle phalanx.  The position of the guidewire was checked on the C-arm in 
the AP and lateral views and found to be central in both views.  I then used 
the starter drill for the micro Acutrak and then a 20-mm screw was placed over 
the guidewire while holding the bones opposed. The guidewire was removed.  The 
finger joint was very stable.  The position of the screw was checked on the C-
arm in AP and lateral views and found to be centralized and the bones were 
opposed.  The wound was copiously irrigated with saline.  The skin edges were 
reapproximated with 4-0 nylon suture.  The draining sinus tract had been 
removed and the skin edges reapproximated very easily.  The wound was dressed 
with Xeroform, 4x4, Webril and an AlumaFoam splint.  The patient tolerated the 
procedure well and brought to the recovery room in good condition.

 

 [id number]/[facility] #: [id number]

MTDSAMIR

## 2021-06-06 NOTE — RAD
INDICATION: 



TECHNIQUE: 2 views of the left knee were obtained.

  

FINDINGS:  The bones are in normal alignment. No fracture is seen. There is a small joint

effusion present.  



There is mild to moderate osteoarthritic change in the medial and patellofemoral

compartments.



IMPRESSION:  

1. SMALL JOINT EFFUSION.

2. MILD TO MODERATE OSTEOARTHRITIC CHANGE. No